# Patient Record
Sex: MALE | Race: WHITE | Employment: OTHER | URBAN - NONMETROPOLITAN AREA
[De-identification: names, ages, dates, MRNs, and addresses within clinical notes are randomized per-mention and may not be internally consistent; named-entity substitution may affect disease eponyms.]

---

## 2021-01-22 ENCOUNTER — HOSPITAL ENCOUNTER (EMERGENCY)
Age: 46
Discharge: HOME | End: 2021-01-22
Attending: EMERGENCY MEDICINE
Payer: MEDICAID

## 2021-01-22 VITALS
SYSTOLIC BLOOD PRESSURE: 155 MMHG | WEIGHT: 196 LBS | RESPIRATION RATE: 16 BRPM | HEIGHT: 74 IN | BODY MASS INDEX: 25.15 KG/M2 | TEMPERATURE: 97.7 F | DIASTOLIC BLOOD PRESSURE: 82 MMHG | OXYGEN SATURATION: 97 % | HEART RATE: 117 BPM

## 2021-01-22 DIAGNOSIS — G89.29 CHRONIC LOW BACK PAIN WITH SCIATICA, SCIATICA LATERALITY UNSPECIFIED, UNSPECIFIED BACK PAIN LATERALITY: Primary | ICD-10-CM

## 2021-01-22 DIAGNOSIS — M54.40 CHRONIC LOW BACK PAIN WITH SCIATICA, SCIATICA LATERALITY UNSPECIFIED, UNSPECIFIED BACK PAIN LATERALITY: Primary | ICD-10-CM

## 2021-01-22 PROCEDURE — 74011250636 HC RX REV CODE- 250/636: Performed by: EMERGENCY MEDICINE

## 2021-01-22 PROCEDURE — 96372 THER/PROPH/DIAG INJ SC/IM: CPT

## 2021-01-22 PROCEDURE — 99283 EMERGENCY DEPT VISIT LOW MDM: CPT

## 2021-01-22 PROCEDURE — 74011250637 HC RX REV CODE- 250/637: Performed by: EMERGENCY MEDICINE

## 2021-01-22 RX ORDER — CYCLOBENZAPRINE HCL 10 MG
10 TABLET ORAL
Status: COMPLETED | OUTPATIENT
Start: 2021-01-22 | End: 2021-01-22

## 2021-01-22 RX ORDER — KETOROLAC TROMETHAMINE 30 MG/ML
60 INJECTION, SOLUTION INTRAMUSCULAR; INTRAVENOUS
Status: COMPLETED | OUTPATIENT
Start: 2021-01-22 | End: 2021-01-22

## 2021-01-22 RX ORDER — CYCLOBENZAPRINE HCL 10 MG
10 TABLET ORAL
Qty: 20 TAB | Refills: 0 | Status: SHIPPED | OUTPATIENT
Start: 2021-01-22 | End: 2021-06-29

## 2021-01-22 RX ORDER — KETOROLAC TROMETHAMINE 10 MG/1
10 TABLET, FILM COATED ORAL
Qty: 15 TAB | Refills: 0 | Status: SHIPPED | OUTPATIENT
Start: 2021-01-22 | End: 2021-06-29

## 2021-01-22 RX ADMIN — METHYLPREDNISOLONE SODIUM SUCCINATE 125 MG: 125 INJECTION, POWDER, FOR SOLUTION INTRAMUSCULAR; INTRAVENOUS at 10:57

## 2021-01-22 RX ADMIN — CYCLOBENZAPRINE 10 MG: 10 TABLET, FILM COATED ORAL at 10:57

## 2021-01-22 RX ADMIN — KETOROLAC TROMETHAMINE 60 MG: 30 INJECTION, SOLUTION INTRAMUSCULAR at 10:57

## 2021-01-22 NOTE — ED TRIAGE NOTES
Pt states that he has a history of back pain with sciatica, has out patient work up set up through [facility] and  [address] appointment, has run out of meds on Tuesday and has had increased pain all week.

## 2021-01-22 NOTE — ED PROVIDER NOTES
EMERGENCY DEPARTMENT HISTORY AND PHYSICAL EXAM      Date: 1/22/2021  Patient Name: Aleisha Veloz    History of Presenting Illness     Chief Complaint   Patient presents with    Back Pain       History Provided By: Patient    HPI: Aleisha Veloz, 50 y.o. male presents to the ED with complaints of back pain. Pt states he has had back pain x 4 months. Pt indicates the pain is on the L side of his back that radiates down his L leg. Pt notes he had ran out of his Flexeril and Tramadol medications on 1.19.2021, and notes he has since attempted to manage his pain with Motrin but with no alleviation. Pt advises he had a fall yesterday, denying any injuries from the fall, that he notes made his wife worried, prompting his visit to the ED today. Pt notes he was diagnosed with L-sided sciatica at the [facility] and was prescribed Flexeril and Tramadol in late November 2020. Pt notes the [facility] is managing his sxs currently. Pt notes he has a nerve study appointment with [facility] on 2.8.2021. Pt advises he has had progressively worsening back pain since sustaining an injury when in the [facility]. There are no other complaints, changes, or physical findings at this time. PCP: Charlene Pitts MD    Current Facility-Administered Medications   Medication Dose Route Frequency Provider Last Rate Last Admin    ketorolac (TORADOL) injection 60 mg  60 mg IntraMUSCular NOW Val Arias MD        methylPREDNISolone (PF) (Solu-MEDROL) injection 125 mg  125 mg IntraMUSCular NOW Val Arias MD        cyclobenzaprine (FLEXERIL) tablet 10 mg  10 mg Oral NOW Val Arias MD         Current Outpatient Medications   Medication Sig Dispense Refill    ketorolac (TORADOL) 10 mg tablet Take 1 Tab by mouth every eight (8) hours as needed for Pain. 15 Tab 0    cyclobenzaprine (FLEXERIL) 10 mg tablet Take 1 Tab by mouth three (3) times daily as needed for Muscle Spasm(s).  20 Tab 0       Past History     Past Medical History:  Past Medical History:   Diagnosis Date    Diabetes ([address]     Disc disorder     Sciatica        Past Surgical History:  Past Surgical History:   Procedure Laterality Date    HX APPENDECTOMY      HX ORTHOPAEDIC         Family History:  History reviewed. No pertinent family history. Social History:  Social History     Tobacco Use    Smoking status: Current Every Day Smoker     Packs/day: 1.00    Smokeless tobacco: Current User   Substance Use Topics    Alcohol use: Yes     Comment: daily     Drug use: Not Currently       Allergies:  No Known Allergies      Review of Systems   Review of Systems   Constitutional: Negative for chills and fever. HENT: Negative for congestion and sore throat. Respiratory: Negative for shortness of breath. Cardiovascular: Negative for chest pain. Gastrointestinal: Negative for nausea and vomiting. Genitourinary: Negative for dysuria. Musculoskeletal: Positive for back pain. Negative for neck pain. Skin: Negative for wound. Neurological: Negative for dizziness. Psychiatric/Behavioral: The patient is not nervous/anxious. Physical Exam   Physical Exam  Vitals signs and nursing note reviewed. Constitutional:       General: He is awake. Appearance: Normal appearance. He is well-developed, well-groomed and normal weight. Interventions: Face mask in place. Comments: Pt ambulates with a cane. HENT:      Head: Normocephalic and atraumatic. Eyes:      Extraocular Movements: Extraocular movements intact. Pupils: Pupils are equal, round, and reactive to light. Neck:      Musculoskeletal: Full passive range of motion without pain, normal range of motion and neck supple. Cardiovascular:      Rate and Rhythm: Normal rate and regular rhythm. Heart sounds: Normal heart sounds. Pulmonary:      Effort: Pulmonary effort is normal.      Breath sounds: Normal breath sounds and air entry.    Abdominal: General: Abdomen is flat. Palpations: Abdomen is soft. Musculoskeletal:      Lumbar back: He exhibits tenderness. Comments: Tenderness to palpation of L5/S1. Skin:     General: Skin is warm and dry. Neurological:      General: No focal deficit present. Mental Status: He is alert and oriented to person, place, and time. Motor: Motor function is intact. Coordination: Coordination is intact. Comments: Straight-leg raises are unremarkable. Psychiatric:         Attention and Perception: Attention and perception normal.         Mood and Affect: Mood and affect normal.         Speech: Speech normal.         Behavior: Behavior normal. Behavior is cooperative. Thought Content: Thought content normal.         Cognition and Memory: Cognition and memory normal.         Judgment: Judgment normal.         Diagnostic Study Results     Labs -   No results found for this or any previous visit (from the past 12 hour(s)). Radiologic Studies -   No orders to display     CT Results  (Last 48 hours)    None        CXR Results  (Last 48 hours)    None          Medical Decision Making and ED Course   I am the first provider for this patient. I reviewed the vital signs, available nursing notes, past medical history, past surgical history, family history and social history. Vital Signs-Reviewed the patient's vital signs. Patient Vitals for the past 12 hrs:   Temp Pulse Resp BP SpO2   01/22/21 1022 97.7 °F (36.5 °C) (!) 117 16 (!) 155/82 97 %       Records Reviewed: Nursing Notes        ED Course:   Initial assessment performed. The patients presenting problems have been discussed, and they are in agreement with the care plan formulated and outlined with them. I have encouraged them to ask questions as they arise throughout their visit. Provider Notes (Medical Decision Making):         Disposition         DISCHARGE PLAN:  1. There are no discharge medications for this patient.     2.   Follow-up Information     Follow up With Specialties Details Why Contact Info    Marli Maldonado MD Family Medicine Schedule an appointment as soon as possible for a visit For sciatica and pain management [address] [facility] [address]  [address]  [facility] [address] [phone]      [facility]  Schedule an appointment as soon as possible for a visit For sciatica and pain management. [address] [facility] [address] [phone]        3.  Return to ED if worse     Diagnosis     Clinical Impression:   1. Chronic low back pain with sciatica, sciatica laterality unspecified, unspecified back pain laterality        Attestations:    By signing my name below, I, Prudence Carrillo, attest that this documentation has been prepared under the direction and in presence of Dr. Arias on 01/22/21. Electronically signed: Prudence Carrillo, 01/22/21, 10:41 AM        Please note that this dictation was completed with [employer], the computer voice recognition software.  Quite often unanticipated grammatical, syntax, homophones, and other interpretive errors are inadvertently transcribed by the computer software.  Please disregard these errors.  Please excuse any errors that have escaped final proofreading.  Thank you.

## 2021-01-22 NOTE — DISCHARGE INSTRUCTIONS

## 2021-06-29 ENCOUNTER — HOSPITAL ENCOUNTER (EMERGENCY)
Age: 46
Discharge: HOME | End: 2021-06-29
Attending: INTERNAL MEDICINE
Payer: MEDICAID

## 2021-06-29 DIAGNOSIS — L03.211 FACIAL CELLULITIS: ICD-10-CM

## 2021-06-29 DIAGNOSIS — H01.005 BLEPHARITIS OF LEFT LOWER EYELID, UNSPECIFIED TYPE: Primary | ICD-10-CM

## 2021-06-29 PROCEDURE — 74011250636 HC RX REV CODE- 250/636: Performed by: INTERNAL MEDICINE

## 2021-06-29 PROCEDURE — 74011000250 HC RX REV CODE- 250: Performed by: INTERNAL MEDICINE

## 2021-06-29 PROCEDURE — 96372 THER/PROPH/DIAG INJ SC/IM: CPT

## 2021-06-29 PROCEDURE — 99284 EMERGENCY DEPT VISIT MOD MDM: CPT

## 2021-06-29 RX ORDER — HYDROCODONE BITARTRATE AND ACETAMINOPHEN 5; 325 MG/1; MG/1
1 TABLET ORAL AS NEEDED
COMMUNITY
Start: 2021-05-27 | End: 2021-07-23

## 2021-06-29 RX ORDER — MOXIFLOXACIN 5 MG/ML
1 SOLUTION/ DROPS OPHTHALMIC 3 TIMES DAILY
Qty: 1 BOTTLE | Refills: 0 | Status: SHIPPED | OUTPATIENT
Start: 2021-06-29 | End: 2021-07-06

## 2021-06-29 RX ORDER — DOXYCYCLINE HYCLATE 100 MG
100 TABLET ORAL 2 TIMES DAILY
Qty: 14 TABLET | Refills: 0 | Status: SHIPPED | OUTPATIENT
Start: 2021-06-29 | End: 2021-07-06

## 2021-06-29 RX ORDER — CEPHALEXIN 500 MG/1
500 CAPSULE ORAL 3 TIMES DAILY
Qty: 21 CAPSULE | Refills: 0 | Status: SHIPPED | OUTPATIENT
Start: 2021-06-29 | End: 2021-07-06

## 2021-06-29 RX ADMIN — CEFAZOLIN SODIUM 1000 MG: 1 INJECTION, POWDER, FOR SOLUTION INTRAMUSCULAR; INTRAVENOUS at 20:42

## 2021-06-29 NOTE — ED PROVIDER NOTES
EMERGENCY DEPARTMENT HISTORY AND PHYSICAL EXAM      Date: 6/29/2021  Patient Name: Donald Whyte    History of Presenting Illness     Chief Complaint   Patient presents with    Eye Pain       History Provided By: Patient    HPI: Donald Whyte, 48 y.o. male with a past medical history significant diabetes and chronic low back pain presents to the ED with cc of left lower eyelid swelling with drainage since waking up this am. He denies vision complaints, and doesn't know if he scratched it in his sleep. No pain with extraocular movement. There are no other complaints, changes, or physical findings at this time. PCP: Kirk Bautista MD    No current facility-administered medications on file prior to encounter. Current Outpatient Medications on File Prior to Encounter   Medication Sig Dispense Refill    HYDROcodone-acetaminophen (NORCO) 5-325 mg per tablet Take 1 Tablet by mouth as needed.  [DISCONTINUED] ketorolac (TORADOL) 10 mg tablet Take 1 Tab by mouth every eight (8) hours as needed for Pain. 15 Tab 0    [DISCONTINUED] cyclobenzaprine (FLEXERIL) 10 mg tablet Take 1 Tab by mouth three (3) times daily as needed for Muscle Spasm(s). 20 Tab 0       Past History     Past Medical History:  Past Medical History:   Diagnosis Date    Diabetes ([address]     Disc disorder     Sciatica        Past Surgical History:  Past Surgical History:   Procedure Laterality Date    HX APPENDECTOMY      HX ORTHOPAEDIC         Family History:  History reviewed. No pertinent family history. Social History:  Social History     Tobacco Use    Smoking status: Current Every Day Smoker     Packs/day: 1.00    Smokeless tobacco: Current User   Substance Use Topics    Alcohol use: Yes     Comment: daily     Drug use: Not Currently       Allergies:  No Known Allergies      Review of Systems     Review of Systems   Constitutional: Negative for activity change and chills.    HENT: Negative for congestion, ear discharge, rhinorrhea, sinus pain and sore throat. Eyes: Positive for discharge. Negative for photophobia, pain, redness, itching and visual disturbance. Respiratory: Negative for cough, shortness of breath and wheezing. Cardiovascular: Negative for palpitations and leg swelling. Gastrointestinal: Negative for abdominal pain, nausea and vomiting. Genitourinary: Negative for flank pain. Musculoskeletal: Negative for back pain, myalgias and neck pain. Skin: Negative for rash. Neurological: Negative for dizziness and headaches. Psychiatric/Behavioral: Negative for behavioral problems and confusion. Physical Exam     Physical Exam  Vitals and nursing note reviewed. Constitutional:       General: He is not in acute distress. Appearance: He is well-developed and normal weight. He is not toxic-appearing. HENT:      Head: Normocephalic and atraumatic. Mouth/Throat:      Pharynx: No pharyngeal swelling, oropharyngeal exudate, posterior oropharyngeal erythema or uvula swelling. Tonsils: No tonsillar exudate or tonsillar abscesses. 3+ on the right. 3+ on the left. Eyes:      Extraocular Movements: Extraocular movements intact. Right eye: Normal extraocular motion and no nystagmus. Left eye: Normal extraocular motion and no nystagmus. Conjunctiva/sclera: Conjunctivae normal.      Pupils: Pupils are equal, round, and reactive to light. Cardiovascular:      Rate and Rhythm: Normal rate and regular rhythm. Pulmonary:      Effort: Pulmonary effort is normal. No respiratory distress. Breath sounds: Normal breath sounds. No stridor. No wheezing. Chest:      Chest wall: No tenderness. Abdominal:      General: Abdomen is flat. Palpations: Abdomen is soft. Tenderness: There is no abdominal tenderness. There is no guarding. Hernia: No hernia is present. Musculoskeletal:         General: No swelling, tenderness or deformity.  Normal range of motion. Cervical back: Normal range of motion and neck supple. Lymphadenopathy:      Cervical: No cervical adenopathy. Skin:     General: Skin is warm and dry. Capillary Refill: Capillary refill takes less than 2 seconds. Neurological:      General: No focal deficit present. Mental Status: He is alert and oriented to person, place, and time. Cranial Nerves: No cranial nerve deficit. Psychiatric:         Mood and Affect: Mood normal.         Behavior: Behavior normal.         Lab and Diagnostic Study Results     Labs -   No results found for this or any previous visit (from the past 12 hour(s)). Radiologic Studies -   @lastxrresult@  CT Results  (Last 48 hours)    None        CXR Results  (Last 48 hours)    None            Medical Decision Making   - I am the first provider for this patient. - I reviewed the vital signs, available nursing notes, past medical history, past surgical history, family history and social history. - Initial assessment performed. The patients presenting problems have been discussed, and they are in agreement with the care plan formulated and outlined with them. I have encouraged them to ask questions as they arise throughout their visit. Vital Signs-Reviewed the patient's vital signs. Patient Vitals for the past 12 hrs:   Temp Pulse Resp SpO2   06/29/21 1838 98.4 °F (36.9 °C) (!) 109 18 98 %       Records Reviewed: Nursing Notes          ED Course:          Provider Notes (Medical Decision Making):     MDM  Number of Diagnoses or Management Options  Diagnosis management comments: This 49 yo male with left lower lid swelling, erythema, and upper cheek swelling, c/w blepharitis and facial cellulitis. Will give IM ancef in ED, home with keflex, vigamox, and close ophtho follow up.     Risk of Complications, Morbidity, and/or Mortality  Presenting problems: moderate  Diagnostic procedures: moderate  Management options: moderate    Patient Progress  Patient progress: stable         Procedures   Medical Decision Makingedical Decision Making  Performed by: Stuart Boo MD  PROCEDURES:  Procedures       Disposition   Disposition: DC- Pain Control DC Home plan: Referral Ophthalmology and Advised to return for signs of head injury, weakness, numbness or tingling to extremities, incontinence        DISCHARGE PLAN:  1. Current Discharge Medication List      CONTINUE these medications which have NOT CHANGED    Details   HYDROcodone-acetaminophen (NORCO) 5-325 mg per tablet Take 1 Tablet by mouth as needed. 2.   Follow-up Information    None       3. Return to ED if worse   4. Current Discharge Medication List            Diagnosis     Clinical Impression:   1. Blepharitis of left lower eyelid, unspecified type    2. Facial cellulitis        Attestations:    Stuart Boo MD    Please note that this dictation was completed with [employer], the computer voice recognition software. Quite often unanticipated grammatical, syntax, homophones, and other interpretive errors are inadvertently transcribed by the computer software. Please disregard these errors. Please excuse any errors that have escaped final proofreading. Thank you.

## 2021-06-29 NOTE — ED TRIAGE NOTES
Pt states he woke up this morning with mild left eye irritation, states as the day went on the pain became worse, the swelling and the drainage became worse as well.

## 2021-06-29 NOTE — ED NOTES
Verbal shift change report given to Glynn Yeung (oncoming nurse) by Minna Nugent (offgoing nurse). Report included the following information ED Summary.

## 2021-06-30 VITALS
BODY MASS INDEX: 21.88 KG/M2 | WEIGHT: 176 LBS | HEIGHT: 75 IN | DIASTOLIC BLOOD PRESSURE: 90 MMHG | OXYGEN SATURATION: 98 % | HEART RATE: 100 BPM | TEMPERATURE: 98.4 F | RESPIRATION RATE: 20 BRPM | SYSTOLIC BLOOD PRESSURE: 155 MMHG

## 2021-06-30 NOTE — DISCHARGE INSTRUCTIONS
Follow up with the eye specialist in 24-48 hours. Return to ER if getting worse. Take medications as prescribed.

## 2021-07-23 ENCOUNTER — HOSPITAL ENCOUNTER (EMERGENCY)
Age: 46
Discharge: HOME | End: 2021-07-23
Attending: FAMILY MEDICINE
Payer: MEDICAID

## 2021-07-23 VITALS
BODY MASS INDEX: 21.14 KG/M2 | HEIGHT: 75 IN | TEMPERATURE: 98.3 F | SYSTOLIC BLOOD PRESSURE: 135 MMHG | WEIGHT: 170 LBS | HEART RATE: 118 BPM | OXYGEN SATURATION: 99 % | RESPIRATION RATE: 20 BRPM | DIASTOLIC BLOOD PRESSURE: 72 MMHG
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
## 2021-07-23 PROCEDURE — 99282 EMERGENCY DEPT VISIT SF MDM: CPT

## 2021-07-23 RX ORDER — GABAPENTIN 300 MG/1
300 CAPSULE ORAL 3 TIMES DAILY
COMMUNITY
End: 2022-01-13

## 2021-07-23 RX ORDER — CEPHALEXIN 500 MG/1
500 CAPSULE ORAL 3 TIMES DAILY
Qty: 21 CAPSULE | Refills: 0 | Status: SHIPPED | OUTPATIENT
Start: 2021-07-23 | End: 2021-07-30

## 2021-07-23 RX ORDER — KETOROLAC TROMETHAMINE 10 MG/1
10 TABLET, FILM COATED ORAL
Qty: 10 TABLET | Refills: 0 | Status: SHIPPED | OUTPATIENT
Start: 2021-07-23

## 2021-07-23 NOTE — ED PROVIDER NOTES
EMERGENCY DEPARTMENT HISTORY AND PHYSICAL EXAM      Date: 7/23/2021  Patient Name: Tonya Logan    History of Presenting Illness     Chief Complaint   Patient presents with    Dog Bite       History Provided By: Patient    HPI: Tonya Logan, 48 y.o. male with a past medical history significant No significant past medical history presents to the ED with cc of dog bite to his right thigh. Patient states the dog belongs to a neighbor behind him. They are unable to confirm that he has rabies vaccine done in the past but the dog is able to be observed. Law enforcement and animal control were consulted to the patient's house before he came here. This event happened about 2 hours ago. He complains of an 8 out of 10 pain. He went to make sure he did need stitches. There are no other complaints, changes, or physical findings at this time. PCP: Daisy, MD Joshua    No current facility-administered medications on file prior to encounter. Current Outpatient Medications on File Prior to Encounter   Medication Sig Dispense Refill    gabapentin (NEURONTIN) 300 mg capsule Take 300 mg by mouth three (3) times daily.  [DISCONTINUED] HYDROcodone-acetaminophen (NORCO) 5-325 mg per tablet Take 1 Tablet by mouth as needed. Past History     Past Medical History:  Past Medical History:   Diagnosis Date    Diabetes ([address]     Disc disorder     Sciatica        Past Surgical History:  Past Surgical History:   Procedure Laterality Date    HX APPENDECTOMY      HX ORTHOPAEDIC         Family History:  History reviewed. No pertinent family history.     Social History:  Social History     Tobacco Use    Smoking status: Current Every Day Smoker     Packs/day: 0.25    Smokeless tobacco: Current User   Substance Use Topics    Alcohol use: Yes     Comment: occasionally     Drug use: Not Currently       Allergies:  No Known Allergies      Review of Systems     Review of Systems   Skin: Positive for wound.   Neurological: Negative for weakness and numbness. Physical Exam     Physical Exam  Vitals and nursing note reviewed. Constitutional:       General: He is awake. He is not in acute distress. Appearance: Normal appearance. He is well-developed and normal weight. He is not ill-appearing, toxic-appearing or diaphoretic. Interventions: Face mask in place. HENT:      Head: Normocephalic and atraumatic. Eyes:      Conjunctiva/sclera: Conjunctivae normal.      Pupils: Pupils are equal, round, and reactive to light. Cardiovascular:      Rate and Rhythm: Normal rate and regular rhythm. Pulses: Normal pulses. Heart sounds: Normal heart sounds. Pulmonary:      Effort: Pulmonary effort is normal.      Breath sounds: Normal breath sounds. Abdominal:      Tenderness: There is no abdominal tenderness. Musculoskeletal:      Cervical back: Normal range of motion and neck supple. Comments: In the right thigh there is a superficial abrasion that does not require sutures. There is redness and ecchymosis around the site. It is tender to palpation. Skin:     General: Skin is warm and dry. Neurological:      General: No focal deficit present. Mental Status: He is alert and oriented to person, place, and time. GCS: GCS eye subscore is 4. GCS verbal subscore is 5. GCS motor subscore is 6. Psychiatric:         Mood and Affect: Mood and affect normal.         Behavior: Behavior normal. Behavior is cooperative. Thought Content: Thought content normal.         Lab and Diagnostic Study Results     Labs -   No results found for this or any previous visit (from the past 12 hour(s)). Radiologic Studies -   @lastxrresult@  CT Results  (Last 48 hours)    None        CXR Results  (Last 48 hours)    None            Medical Decision Making   - I am the first provider for this patient.     - I reviewed the vital signs, available nursing notes, past medical history, past surgical history, family history and social history. - Initial assessment performed. The patients presenting problems have been discussed, and they are in agreement with the care plan formulated and outlined with them. I have encouraged them to ask questions as they arise throughout their visit. Vital Signs-Reviewed the patient's vital signs. Patient Vitals for the past 12 hrs:   Temp Pulse Resp BP SpO2   07/23/21 1549 98.3 °F (36.8 °C) (!) 118 20 135/72 99 %       Records Reviewed: Nursing Notes        ED Course:          Provider Notes (Medical Decision Making): MDM       Procedures   Medical Decision Makingedical Decision Making  Performed by: Cy Malagon MD  PROCEDURES:  Procedures       Disposition   Disposition:     Discharged    DISCHARGE PLAN:  1. Current Discharge Medication List      START taking these medications    Details   ketorolac (TORADOL) 10 mg tablet Take 1 Tablet by mouth every six (6) hours as needed for Pain. Qty: 10 Tablet, Refills: 0      cephALEXin (Keflex) 500 mg capsule Take 1 Capsule by mouth three (3) times daily for 7 days. Qty: 21 Capsule, Refills: 0         CONTINUE these medications which have NOT CHANGED    Details   gabapentin (NEURONTIN) 300 mg capsule Take 300 mg by mouth three (3) times daily. 2.   Follow-up Information     Follow up With Specialties Details Why Александр Stevens MD Family Medicine  As needed [facility] [id number] [phone]          3. Return to ED if worse   4. Current Discharge Medication List      START taking these medications    Details   ketorolac (TORADOL) 10 mg tablet Take 1 Tablet by mouth every six (6) hours as needed for Pain. Qty: 10 Tablet, Refills: 0  Start date: 7/23/2021      cephALEXin (Keflex) 500 mg capsule Take 1 Capsule by mouth three (3) times daily for 7 days.   Qty: 21 Capsule, Refills: 0  Start date: 7/23/2021, End date: 7/30/2021               Diagnosis Clinical Impression:   1. Dog bite, initial encounter        Attestations:    Jenny Young MD    Please note that this dictation was completed with "[employer] the computer voice recognition software. Quite often unanticipated grammatical, syntax, homophones, and other interpretive errors are inadvertently transcribed by the computer software. Please disregard these errors. Please excuse any errors that have escaped final proofreading. Thank you.

## 2022-01-13 ENCOUNTER — HOSPITAL ENCOUNTER (EMERGENCY)
Age: 47
Discharge: HOME | End: 2022-01-13
Attending: FAMILY MEDICINE
Payer: MEDICAID

## 2022-01-13 VITALS
HEART RATE: 85 BPM | SYSTOLIC BLOOD PRESSURE: 138 MMHG | OXYGEN SATURATION: 99 % | RESPIRATION RATE: 18 BRPM | HEIGHT: 75 IN | DIASTOLIC BLOOD PRESSURE: 88 MMHG | WEIGHT: 181 LBS | BODY MASS INDEX: 22.5 KG/M2 | TEMPERATURE: 98.7 F

## 2022-01-13 DIAGNOSIS — R42 VERTIGO: Primary | ICD-10-CM

## 2022-01-13 LAB
ANION GAP SERPL CALC-SCNC: 7 MMOL/L
BASOPHILS # BLD: 0.1 K/UL (ref 0–0.1)
BASOPHILS NFR BLD: 1 % (ref 0–2)
BUN SERPL-MCNC: 11 MG/DL (ref 9–21)
BUN/CREAT SERPL: 22
CA-I BLD-MCNC: 9.5 MG/DL (ref 8.5–10.5)
CHLORIDE SERPL-SCNC: 100 MMOL/L (ref 94–111)
CO2 SERPL-SCNC: 31 MMOL/L (ref 21–33)
CREAT SERPL-MCNC: 0.5 MG/DL (ref 0.8–1.5)
DIFFERENTIAL METHOD BLD: NORMAL
EOSINOPHIL # BLD: 0.3 K/UL (ref 0–0.4)
EOSINOPHIL NFR BLD: 4 % (ref 0–5)
ERYTHROCYTE [DISTWIDTH] IN BLOOD BY AUTOMATED COUNT: 12.8 % (ref 11.6–14.5)
GLUCOSE SERPL-MCNC: 165 MG/DL (ref 70–110)
HCT VFR BLD AUTO: 42.6 % (ref 36–48)
HGB BLD-MCNC: 14.2 G/DL (ref 13–16)
IMM GRANULOCYTES # BLD AUTO: 0 K/UL (ref 0–0.04)
IMM GRANULOCYTES NFR BLD AUTO: 0 % (ref 0–0.5)
LYMPHOCYTES # BLD: 2.7 K/UL (ref 0.9–3.6)
LYMPHOCYTES NFR BLD: 39 % (ref 21–52)
MAGNESIUM SERPL-MCNC: 1.9 MG/DL (ref 1.7–2.8)
MCH RBC QN AUTO: 31.1 PG (ref 24–34)
MCHC RBC AUTO-ENTMCNC: 33.3 G/DL (ref 31–37)
MCV RBC AUTO: 93.4 FL (ref 78–100)
MONOCYTES # BLD: 0.6 K/UL (ref 0.05–1.2)
MONOCYTES NFR BLD: 9 % (ref 3–10)
NEUTS SEG # BLD: 3.2 K/UL (ref 1.8–8)
NEUTS SEG NFR BLD: 47 % (ref 40–73)
NRBC # BLD: 0 K/UL (ref 0–0.01)
NRBC BLD-RTO: 0 PER 100 WBC
PLATELET # BLD AUTO: 293 K/UL (ref 135–420)
PMV BLD AUTO: 10.1 FL (ref 9.2–11.8)
POTASSIUM SERPL-SCNC: 4.4 MMOL/L (ref 3.2–5.1)
RBC # BLD AUTO: 4.56 M/UL (ref 4.35–5.65)
SODIUM SERPL-SCNC: 138 MMOL/L (ref 135–145)
WBC # BLD AUTO: 6.9 K/UL (ref 4.6–13.2)

## 2022-01-13 PROCEDURE — 85025 COMPLETE CBC W/AUTO DIFF WBC: CPT

## 2022-01-13 PROCEDURE — 93005 ELECTROCARDIOGRAM TRACING: CPT

## 2022-01-13 PROCEDURE — 36415 COLL VENOUS BLD VENIPUNCTURE: CPT

## 2022-01-13 PROCEDURE — 80048 BASIC METABOLIC PNL TOTAL CA: CPT

## 2022-01-13 PROCEDURE — 99284 EMERGENCY DEPT VISIT MOD MDM: CPT

## 2022-01-13 PROCEDURE — 83735 ASSAY OF MAGNESIUM: CPT

## 2022-01-13 RX ORDER — MECLIZINE HYDROCHLORIDE 25 MG/1
25 TABLET ORAL
Qty: 10 TABLET | Refills: 0 | Status: SHIPPED | OUTPATIENT
Start: 2022-01-13

## 2022-01-13 NOTE — ED TRIAGE NOTES
Pt states he was instructed by the [address] [place] [address] to come to the ED. Pt states he had vertigo back in December, states he had a dizzy episode 2 days ago and he passed out. EMS was called to pt, he did not feel he needed to be seen at that time, but when he called his doctor they told him to go straight to the ED and  Get checked out. Pt has not had any other dizzy spells since he passed out 2 days ago. Pt denies any chest pain. Pt was never prescribed any medicine for the vertigo.

## 2022-01-14 LAB
ATRIAL RATE: 75 BPM
CALCULATED P AXIS, ECG09: 77 DEGREES
CALCULATED R AXIS, ECG10: 53 DEGREES
CALCULATED T AXIS, ECG11: 51 DEGREES
DIAGNOSIS, 93000: NORMAL
P-R INTERVAL, ECG05: 160 MS
Q-T INTERVAL, ECG07: 395 MS
QRS DURATION, ECG06: 104 MS
QTC CALCULATION (BEZET), ECG08: 445 MS
VENTRICULAR RATE, ECG03: 76 BPM

## 2022-01-14 NOTE — DISCHARGE INSTRUCTIONS
Thank you for allowing us to provide you with excellent care today. We hope we addressed all of your concerns and needs. We strive to provide excellent quality care in the Emergency Department. Anything less than excellent does not meet our expectations for you. If you feel that you have not received excellent quality care or timely care, please ask to speak to the nurse manager. Please choose us in the future for your continued health care needs. The exam and treatment you received in the Emergency Department were for an urgent problem and are not intended as complete care. It is important that you follow-up with a doctor, nurse practitioner, or physician assistant to:  (1) confirm your diagnosis,  (2) re-evaluation of changes in your illness and treatment, and  (3) for ongoing care. If your symptoms become worse or you do not improve as expected and you are unable to reach your usual health care provider, you should return to the Emergency Department. We are available 24 hours a day. Take this sheet with you when you go to your follow-up visit. If you have any problem arranging the follow-up visit, contact [phone] ([phone])    Make an appointment with your Primary Care doctor for follow up of this visit. Return to the ER if you are unable to be seen in the time recommended on your discharge instructions.

## 2022-01-14 NOTE — ED PROVIDER NOTES
EMERGENCY DEPARTMENT HISTORY AND PHYSICAL EXAM      Date: 1/13/2022  Patient Name: Randee Jimenez    History of Presenting Illness     Chief Complaint   Patient presents with    Dizziness       History Provided By: Patient    HPI: Randee Jimenez, 48 y.o. male with a past medical history significant lumbar radiculopathy, chronic back pain, vertigo presents to the ED with cc of dizziness/vertigo. He states that 2 days ago, he had an episode of dizziness/vertigo that caused him to pass out. He did not hit his head or suffer any injuries, lacerations, trauma. He states that in December 2021, he had the same symptoms and was told he had vertigo. He did not get any medications for this. When this happened 2 days ago, EMS came and checked him out and told him that he had no acute abnormalities, that he could stay home or be brought to the ER and he said he was not concerned, that he wanted to stay home. He called his doctor at the [facility], and they recommended coming to the ER. He is currently denying any symptoms. He denies any visual changes, tinnitus, headache, dizziness, numbness, tingling, chest pain, shortness of breath. No neck pain, no new back pain. He does walk with a cane due to back pain. No changes in bowel or bladder habitus. There are no other complaints, changes, or physical findings at this time. PCP: Daisy, MD Joshua    No current facility-administered medications on file prior to encounter. Current Outpatient Medications on File Prior to Encounter   Medication Sig Dispense Refill    ketorolac (TORADOL) 10 mg tablet Take 1 Tablet by mouth every six (6) hours as needed for Pain.  10 Tablet 0       Past History     Past Medical History:  Past Medical History:   Diagnosis Date    Diabetes ([address]     Disc disorder     Sciatica        Past Surgical History:  Past Surgical History:   Procedure Laterality Date    HX APPENDECTOMY      HX ORTHOPAEDIC         Family History:  History reviewed. No pertinent family history. Social History:  Social History     Tobacco Use    Smoking status: Current Every Day Smoker     Packs/day: 0.25    Smokeless tobacco: Current User   Substance Use Topics    Alcohol use: Yes     Comment: occasionally     Drug use: Not Currently       Allergies:  No Known Allergies      Review of Systems   CONSTITUTIONAL: Denies weight loss, fever and chills. HEENT: Denies changes in vision and hearing. RESPIRATORY: Denies SOB and cough. CV: Denies palpitations and CP. GI: Denies abdominal pain, nausea, vomiting and diarrhea. : Denies dysuria and urinary frequency. MSK: Denies myalgia and joint pain. SKIN: Denies rash and pruritus. NEUROLOGICAL: Denies headache. +dizziness, vertigo. PSYCHIATRIC: Denies recent changes in mood. Denies anxiety and depression. Review of Systems    Physical Exam   GENERAL: Vital signs reviewed patient is afebrile with a temperature 98.7 °F.  O2 sat 97% on room air. Alert and oriented x 3. No acute distress. Well-nourished. EYES: EOMI. Anicteric. HENT: Moist mucous membranes. No scleral icterus. No cervical lymphadenopathy. LUNGS: Clear to auscultation bilaterally. No accessory muscle use. CARDIOVASCULAR: Regular rate and rhythm. No murmur. No JVD. ABDOMEN: Soft, non-tender and non-distended. No palpable masses. EXTREMITIES: No edema. Non-tender. SKIN: No rashes or lesions. Warm. NEUROLOGIC: No focal neurological deficits. CN II-XII grossly intact, but not individually tested. PSYCHIATRIC: Cooperative. Appropriate mood and affect.     Physical Exam    Lab and Diagnostic Study Results     Labs -     Recent Results (from the past 12 hour(s))   EKG, 12 LEAD, INITIAL    Collection Time: 01/13/22  8:23 PM   Result Value Ref Range    Ventricular Rate 76 BPM    Atrial Rate 75 BPM    P-R Interval 160 ms    QRS Duration 104 ms    Q-T Interval 395 ms    QTC Calculation (Bezet) 445 ms    Calculated P Axis 77 degrees Calculated R Axis 53 degrees    Calculated T Axis 51 degrees    Diagnosis       Sinus rhythm  Left atrial enlargement  Left ventricular hypertrophy     CBC WITH AUTOMATED DIFF    Collection Time: 01/13/22  8:25 PM   Result Value Ref Range    WBC 6.9 4.6 - 13.2 K/uL    RBC 4.56 4.35 - 5.65 M/uL    HGB 14.2 13.0 - 16.0 g/dL    HCT 42.6 36.0 - 48.0 %    MCV 93.4 78.0 - 100.0 FL    MCH 31.1 24.0 - 34.0 PG    MCHC 33.3 31.0 - 37.0 g/dL    RDW 12.8 11.6 - 14.5 %    PLATELET 817 533 - 764 K/uL    MPV 10.1 9.2 - 11.8 FL    NRBC 0.0 0.0  WBC    ABSOLUTE NRBC 0.00 0.00 - 0.01 K/uL    NEUTROPHILS 47 40 - 73 %    LYMPHOCYTES 39 21 - 52 %    MONOCYTES 9 3 - 10 %    EOSINOPHILS 4 0 - 5 %    BASOPHILS 1 0 - 2 %    IMMATURE GRANULOCYTES 0 0 - 0.5 %    ABS. NEUTROPHILS 3.2 1.8 - 8.0 K/UL    ABS. LYMPHOCYTES 2.7 0.9 - 3.6 K/UL    ABS. MONOCYTES 0.6 0.05 - 1.2 K/UL    ABS. EOSINOPHILS 0.3 0.0 - 0.4 K/UL    ABS. BASOPHILS 0.1 0.0 - 0.1 K/UL    ABS. IMM. GRANS. 0.0 0.00 - 0.04 K/UL    DF AUTOMATED     METABOLIC PANEL, BASIC    Collection Time: 01/13/22  8:25 PM   Result Value Ref Range    Sodium 138 135 - 145 mmol/L    Potassium 4.4 3.2 - 5.1 mmol/L    Chloride 100 94 - 111 mmol/L    CO2 31 21 - 33 mmol/L    Anion gap 7 mmol/L    Glucose 165 (H) 70 - 110 mg/dL    BUN 11 9 - 21 mg/dL    Creatinine 0.50 (L) 0.8 - 1.50 mg/dL    BUN/Creatinine ratio 22      GFR est AA >60 ml/min/1.73m2    GFR est non-AA >60 ml/min/1.73m2    Calcium 9.5 8.5 - 10.5 mg/dL   MAGNESIUM    Collection Time: 01/13/22  8:25 PM   Result Value Ref Range    Magnesium 1.9 1.7 - 2.8 mg/dL       Radiologic Studies -   @[email or web address]@  CT Results  (Last 48 hours)    None        CXR Results  (Last 48 hours)    None            Medical Decision Making   - I am the first provider for this patient. - I reviewed the vital signs, available nursing notes, past medical history, past surgical history, family history and social history. - Initial assessment performed. The patients presenting problems have been discussed, and they are in agreement with the care plan formulated and outlined with them. I have encouraged them to ask questions as they arise throughout their visit. Vital Signs-Reviewed the patient's vital signs. Patient Vitals for the past 12 hrs:   Temp Pulse Resp BP SpO2   01/13/22 2138 98.7 °F (37.1 °C) 85 18 138/88 99 %   01/13/22 1855 98.7 °F (37.1 °C) 95 18 (!) 143/87 97 %       Records Reviewed: Nursing Notes and Old Medical Records    The patient presents with dizziness with a differential diagnosis of  cardiac dysrhythmia, dizziness/vertigo, generalized weakness, hypertension, labrynthitis, syncope/loss of consciousness, TIA, vasovagal episode and Ménière's disease, vestibular neuronitis, ACS, orthostatic hypotension, electrolyte abnormality, PVC, anxiety, pneumonia, bronchitis, bronchospasm, neurogenic claudication. ED Course:     ED Course as of 01/14/22 0329   Thu Jan 13, 2022 2045 Pleasant 58-year-old male presents to the ER with a chief complaint of episode of dizziness 2 days ago. He was seen by EMS after it happened and they told him he had no abnormal vitals or physical exam signs and that he could stay home or be brought to the ER. He chose to stay home. He called his doctor at his [address] [place] [address] and the doctor told him to be seen at the ER. He presents today completely asymptomatic. He did have episodes like this in December 2021 and was diagnosed with vertigo. No medications were given. [OM]   2046 Physical exam is benign and vital signs are normal, patient afebrile. [OM]   2046 TODAY I, EN ARREDONDO D.O., PERSONALLY VISUALIZED THE PATIENT'S EKG TRACING. I AM THE PRIMARY . Rate 76, , , QTc 445. Sinus rhythm with LVH. No ST elevation or depression. No STEMI. [OM]   2046 Monitored in the ER, labs and EKG are done. He remains completely asymptomatic with normal vital signs.   Through shared decision-making, it is decided that patient does not require head CT at this time given his singular episode and the risk of radiation. The many etiologies of his symptoms are discussed including vertigo, laryngitis, vestibular neuronitis, Ménière's disease, and the last likely etiologies including arrhythmia, underlying cardiac etiologies. Patient has no cardiac history, no major family cardiac history, no chest pain or shortness of breath which is reassuring. He does not want extensive cardiac work-up and feels comfortable following up with his [place] doctor. A ENT referral will be given so patient may use this at his convenience. He will also be given a low-dose prescription for meclizine to use as needed. The possible side effects including sedation are discussed with him. [OM]   2132 WBC: 6.9 [OM]   2132 RBC: 4.56 [OM]   2132 HGB: 14.2 [OM]   2132 HCT: 42.6 [OM]   2132 Magnesium: 1.9 [OM]   2132 Sodium: 138 [OM]   2133 Potassium: 4.4 [OM]   2133 Chloride: 100 [OM]   2133 CO2: 31  Lab abnormalities. All results discussed with patient. He is given signs and symptoms that would warrant urgent medical evaluation. [OM]      ED Course User Index  [OM] Marvin Hernandez DO       Provider Notes (Medical Decision Making):   A thorough discussion on possible etiologies, benign to life-threatening, and with patient. At this time, he decides to forego CT head or observational admission for cardiac work-up. He states that he had 1 episode 2 days ago and the last time he experienced it was in December. He states he feels fine and given initial work-up with no acute abnormalities, he would rather monitor his symptoms, follow-up with his [place] doctor, and also takes ENT referral.  He will return to the ER with any acute symptoms such as shortness of breath, chest pain, numbness, tingling, syncope, or continued dizziness.   He has no cardiac or pulmonary history, does not smoke, has no history of DVT or PE, there is no family history of CVA or MI, no history of malignancy, does not drink alcohol, and is otherwise very healthy. He does follow with his doctor regularly. He verbalizes understanding and feels comfortable with disposition plan. He has remained asymptomatic entire ER visit and is asymptomatic at time of discharge. MDM       Procedures   Medical Decision Makingedical Decision Making  Performed by: Freedom Ordoñez DO  PROCEDURES:  Procedures       Disposition   Disposition: DC- Adult Discharges: All of the diagnostic tests were reviewed and questions answered. Diagnosis, care plan and treatment options were discussed. The patient understands the instructions and will follow up as directed. The patients results have been reviewed with them. They have been counseled regarding their diagnosis. The patient verbally convey understanding and agreement of the signs, symptoms, diagnosis, treatment and prognosis and additionally agrees to follow up as recommended with their PCP in 24 - 48 hours. They also agree with the care-plan and convey that all of their questions have been answered. I have also put together some discharge instructions for them that include: 1) educational information regarding their diagnosis, 2) how to care for their diagnosis at home, as well a 3) list of reasons why they would want to return to the ED prior to their follow-up appointment, should their condition change. Discharged    DISCHARGE PLAN:  1. Current Discharge Medication List      CONTINUE these medications which have NOT CHANGED    Details   ketorolac (TORADOL) 10 mg tablet Take 1 Tablet by mouth every six (6) hours as needed for Pain. Qty: 10 Tablet, Refills: 0           2. Follow-up Information     Follow up With Specialties Details Why Contact Info    Ara Ness DO Otolaryngology Schedule an appointment as soon as possible for a visit  As needed [address] [facility] [address] [phone]          3.   Return to ED if worse 4.   Discharge Medication List as of 1/13/2022  9:34 PM      START taking these medications    Details   meclizine (ANTIVERT) 25 mg tablet Take 1 Tablet by mouth three (3) times daily as needed for Dizziness., Normal, Disp-10 Tablet, R-0         CONTINUE these medications which have NOT CHANGED    Details   ketorolac (TORADOL) 10 mg tablet Take 1 Tablet by mouth every six (6) hours as needed for Pain., Normal, Disp-10 Tablet, R-0               Diagnosis     Clinical Impression:   1. Vertigo        Attestations:    Jodi Araiza, DO    Please note that this dictation was completed with "[employer] the computer voice recognition software. Quite often unanticipated grammatical, syntax, homophones, and other interpretive errors are inadvertently transcribed by the computer software. Please disregard these errors. Please excuse any errors that have escaped final proofreading. Thank you.

## 2023-05-25 ENCOUNTER — APPOINTMENT (OUTPATIENT)
Facility: HOSPITAL | Age: 48
End: 2023-05-25
Payer: MEDICAID

## 2023-05-25 ENCOUNTER — HOSPITAL ENCOUNTER (EMERGENCY)
Facility: HOSPITAL | Age: 48
Discharge: HOME | End: 2023-05-26
Attending: STUDENT IN AN ORGANIZED HEALTH CARE EDUCATION/TRAINING PROGRAM
Payer: MEDICAID

## 2023-05-25 ENCOUNTER — HOSPITAL ENCOUNTER (EMERGENCY)
Age: 48
Discharge: ANOTHER HOSPITAL | End: 2023-05-25
Attending: FAMILY MEDICINE
Payer: MEDICAID

## 2023-05-25 ENCOUNTER — APPOINTMENT (OUTPATIENT)
Age: 48
End: 2023-05-25
Payer: MEDICAID

## 2023-05-25 VITALS
HEART RATE: 94 BPM | TEMPERATURE: 98 F | DIASTOLIC BLOOD PRESSURE: 97 MMHG | OXYGEN SATURATION: 97 % | SYSTOLIC BLOOD PRESSURE: 159 MMHG | BODY MASS INDEX: 26.73 KG/M2 | HEIGHT: 75 IN | WEIGHT: 215 LBS | RESPIRATION RATE: 18 BRPM

## 2023-05-25 VITALS
OXYGEN SATURATION: 98 % | HEART RATE: 90 BPM | BODY MASS INDEX: 26.73 KG/M2 | TEMPERATURE: 97.5 F | SYSTOLIC BLOOD PRESSURE: 156 MMHG | DIASTOLIC BLOOD PRESSURE: 89 MMHG | HEIGHT: 75 IN | WEIGHT: 215 LBS | RESPIRATION RATE: 17 BRPM

## 2023-05-25 DIAGNOSIS — R03.0 ELEVATED BLOOD PRESSURE READING: ICD-10-CM

## 2023-05-25 DIAGNOSIS — I86.1 LEFT VARICOCELE: Primary | ICD-10-CM

## 2023-05-25 DIAGNOSIS — N50.812 LEFT TESTICULAR PAIN: Primary | ICD-10-CM

## 2023-05-25 DIAGNOSIS — R73.9 HYPERGLYCEMIA: ICD-10-CM

## 2023-05-25 DIAGNOSIS — N43.3 HYDROCELE, UNSPECIFIED HYDROCELE TYPE: ICD-10-CM

## 2023-05-25 DIAGNOSIS — N50.812 LEFT TESTICULAR PAIN: ICD-10-CM

## 2023-05-25 LAB
ALBUMIN SERPL-MCNC: 3 G/DL (ref 3.4–5)
ALBUMIN/GLOB SERPL: 0.9 (ref 0.8–1.7)
ALP SERPL-CCNC: 91 U/L (ref 45–117)
ALT SERPL-CCNC: 29 U/L (ref 16–61)
ANION GAP SERPL CALC-SCNC: 7 MMOL/L (ref 3–18)
APPEARANCE UR: CLEAR
AST SERPL W P-5'-P-CCNC: 17 U/L (ref 10–38)
BACTERIA URNS QL MICRO: NEGATIVE /HPF
BASOPHILS # BLD: 0.1 K/UL (ref 0–0.1)
BASOPHILS NFR BLD: 1 % (ref 0–2)
BILIRUB SERPL-MCNC: 0.3 MG/DL (ref 0.2–1)
BILIRUB UR QL: NEGATIVE
BUN SERPL-MCNC: 9 MG/DL (ref 7–18)
BUN/CREAT SERPL: 14 (ref 12–20)
CA-I BLD-MCNC: 8.7 MG/DL (ref 8.5–10.1)
CHLORIDE SERPL-SCNC: 105 MMOL/L (ref 100–111)
CO2 SERPL-SCNC: 27 MMOL/L (ref 21–32)
COLOR UR: YELLOW
CREAT SERPL-MCNC: 0.65 MG/DL (ref 0.6–1.3)
DIFFERENTIAL METHOD BLD: ABNORMAL
EOSINOPHIL # BLD: 0.3 K/UL (ref 0–0.4)
EOSINOPHIL NFR BLD: 3 % (ref 0–5)
EPITH CASTS URNS QL MICRO: ABNORMAL /LPF (ref 0–20)
ERYTHROCYTE [DISTWIDTH] IN BLOOD BY AUTOMATED COUNT: 12.1 % (ref 11.6–14.5)
GLOBULIN SER CALC-MCNC: 3.5 G/DL (ref 2–4)
GLUCOSE SERPL-MCNC: 251 MG/DL (ref 74–99)
GLUCOSE UR STRIP.AUTO-MCNC: >1000 MG/DL
HCT VFR BLD AUTO: 46.8 % (ref 36–48)
HGB BLD-MCNC: 16.1 G/DL (ref 13–16)
HGB UR QL STRIP: NEGATIVE
IMM GRANULOCYTES # BLD AUTO: 0 K/UL (ref 0–0.04)
IMM GRANULOCYTES NFR BLD AUTO: 0 % (ref 0–0.5)
KETONES UR QL STRIP.AUTO: NEGATIVE MG/DL
LEUKOCYTE ESTERASE UR QL STRIP.AUTO: NEGATIVE
LYMPHOCYTES # BLD: 2.5 K/UL (ref 0.9–3.6)
LYMPHOCYTES NFR BLD: 29 % (ref 21–52)
MCH RBC QN AUTO: 32.3 PG (ref 24–34)
MCHC RBC AUTO-ENTMCNC: 34.4 G/DL (ref 31–37)
MCV RBC AUTO: 94 FL (ref 78–100)
MONOCYTES # BLD: 0.8 K/UL (ref 0.05–1.2)
MONOCYTES NFR BLD: 10 % (ref 3–10)
NEUTS SEG # BLD: 4.9 K/UL (ref 1.8–8)
NEUTS SEG NFR BLD: 57 % (ref 40–73)
NITRITE UR QL STRIP.AUTO: NEGATIVE
NRBC # BLD: 0 K/UL (ref 0–0.01)
NRBC BLD-RTO: 0 PER 100 WBC
PH UR STRIP: 7.5 (ref 5–8)
PLATELET # BLD AUTO: 265 K/UL (ref 135–420)
PMV BLD AUTO: 10.2 FL (ref 9.2–11.8)
POTASSIUM SERPL-SCNC: 3.9 MMOL/L (ref 3.5–5.5)
PROT SERPL-MCNC: 6.5 G/DL (ref 6.4–8.2)
PROT UR STRIP-MCNC: ABNORMAL MG/DL
RBC # BLD AUTO: 4.98 M/UL (ref 4.35–5.65)
RBC #/AREA URNS HPF: ABNORMAL /HPF (ref 0–2)
SODIUM SERPL-SCNC: 139 MMOL/L (ref 136–145)
SP GR UR REFRACTOMETRY: >1.03 (ref 1–1.03)
UROBILINOGEN UR QL STRIP.AUTO: 1 EU/DL (ref 0.2–1)
WBC # BLD AUTO: 8.6 K/UL (ref 4.6–13.2)
WBC URNS QL MICRO: ABNORMAL /HPF (ref 0–4)

## 2023-05-25 PROCEDURE — 99284 EMERGENCY DEPT VISIT MOD MDM: CPT

## 2023-05-25 PROCEDURE — 81001 URINALYSIS AUTO W/SCOPE: CPT

## 2023-05-25 PROCEDURE — 80053 COMPREHEN METABOLIC PANEL: CPT

## 2023-05-25 PROCEDURE — 96375 TX/PRO/DX INJ NEW DRUG ADDON: CPT

## 2023-05-25 PROCEDURE — 6360000002 HC RX W HCPCS: Performed by: FAMILY MEDICINE

## 2023-05-25 PROCEDURE — 6360000004 HC RX CONTRAST MEDICATION: Performed by: FAMILY MEDICINE

## 2023-05-25 PROCEDURE — 76870 US EXAM SCROTUM: CPT

## 2023-05-25 PROCEDURE — 96374 THER/PROPH/DIAG INJ IV PUSH: CPT

## 2023-05-25 PROCEDURE — 99285 EMERGENCY DEPT VISIT HI MDM: CPT

## 2023-05-25 PROCEDURE — 85025 COMPLETE CBC W/AUTO DIFF WBC: CPT

## 2023-05-25 PROCEDURE — 2580000003 HC RX 258: Performed by: FAMILY MEDICINE

## 2023-05-25 PROCEDURE — 72193 CT PELVIS W/DYE: CPT

## 2023-05-25 PROCEDURE — 96361 HYDRATE IV INFUSION ADD-ON: CPT

## 2023-05-25 PROCEDURE — 36415 COLL VENOUS BLD VENIPUNCTURE: CPT

## 2023-05-25 RX ORDER — ONDANSETRON 2 MG/ML
4 INJECTION INTRAMUSCULAR; INTRAVENOUS
Status: COMPLETED | OUTPATIENT
Start: 2023-05-25 | End: 2023-05-25

## 2023-05-25 RX ORDER — MORPHINE SULFATE 4 MG/ML
4 INJECTION, SOLUTION INTRAMUSCULAR; INTRAVENOUS
Status: COMPLETED | OUTPATIENT
Start: 2023-05-25 | End: 2023-05-25

## 2023-05-25 RX ORDER — GABAPENTIN 400 MG/1
800 CAPSULE ORAL 3 TIMES DAILY
COMMUNITY

## 2023-05-25 RX ORDER — 0.9 % SODIUM CHLORIDE 0.9 %
1000 INTRAVENOUS SOLUTION INTRAVENOUS ONCE
Status: COMPLETED | OUTPATIENT
Start: 2023-05-25 | End: 2023-05-25

## 2023-05-25 RX ADMIN — SODIUM CHLORIDE 1000 ML: 9 INJECTION, SOLUTION INTRAVENOUS at 16:59

## 2023-05-25 RX ADMIN — ONDANSETRON 4 MG: 2 INJECTION INTRAMUSCULAR; INTRAVENOUS at 17:21

## 2023-05-25 RX ADMIN — MORPHINE SULFATE 4 MG: 4 INJECTION, SOLUTION INTRAMUSCULAR; INTRAVENOUS at 17:27

## 2023-05-25 RX ADMIN — IOPAMIDOL 95 ML: 755 INJECTION, SOLUTION INTRAVENOUS at 17:58

## 2023-05-25 ASSESSMENT — PAIN SCALES - GENERAL
PAINLEVEL_OUTOF10: 9
PAINLEVEL_OUTOF10: 9
PAINLEVEL_OUTOF10: 7
PAINLEVEL_OUTOF10: 10

## 2023-05-25 ASSESSMENT — PAIN - FUNCTIONAL ASSESSMENT
PAIN_FUNCTIONAL_ASSESSMENT: 0-10
PAIN_FUNCTIONAL_ASSESSMENT: 0-10

## 2023-05-25 ASSESSMENT — PAIN DESCRIPTION - LOCATION
LOCATION: GROIN
LOCATION: SCROTUM

## 2023-05-25 ASSESSMENT — PAIN DESCRIPTION - ORIENTATION
ORIENTATION: LEFT
ORIENTATION: LEFT

## 2023-05-25 NOTE — ED PROVIDER NOTES
[facility] EMERGENCY DEPT  EMERGENCY DEPARTMENT ENCOUNTER      Pt Name: Min Roberto  MRN: [id number]  [place] 1975  Date of evaluation: 5/25/2023  Provider: Leonid Graff DO    CHIEF COMPLAINT       Chief Complaint   Patient presents with    Groin Swelling     left         HISTORY OF PRESENT ILLNESS   (Location/Symptom, Timing/Onset, Context/Setting, Quality, Duration, Modifying Factors, Severity)  Note limiting factors. Min Roberto is a 46 y.o. male who presents to the emergency department patient comes in stating he woke up Monday morning with some left testicular pain. Progressively getting worse. He thought he had just slept on it. Had anything for the pain. The pain is severe. Lifting the testicle makes it feel better. Never had this pain before. Has had a vasectomy in the past.  He states he feels a knot in his left testicle. HPI    Nursing Notes were reviewed. REVIEW OF SYSTEMS    (2-9 systems for level 4, 10 or more for level 5)     Review of Systems   Genitourinary:  Positive for testicular pain. Negative for penile discharge. All other systems reviewed and are negative. Except as noted above the remainder of the review of systems was reviewed and negative. PAST MEDICAL HISTORY     Past Medical History:   Diagnosis Date    Diabetes ([address]     Disc disorder     Sciatica          SURGICAL HISTORY       Past Surgical History:   Procedure Laterality Date    APPENDECTOMY      ORTHOPEDIC SURGERY           CURRENT MEDICATIONS       Previous Medications    GABAPENTIN (NEURONTIN) 400 MG CAPSULE    Take 2 capsules by mouth 3 times daily. Max Daily Amount: 2,400 mg    KETOROLAC (TORADOL) 10 MG TABLET    Take 10 mg by mouth every 6 hours as needed    MECLIZINE (ANTIVERT) 25 MG TABLET    Take 25 mg by mouth 3 times daily as needed       ALLERGIES     Patient has no known allergies. FAMILY HISTORY     History reviewed. No pertinent family history.        SOCIAL HISTORY

## 2023-05-25 NOTE — ED NOTES
[address] for transfer to [facility] ED for ultrasound      Kristie Ganser  05/25/23 [id number]

## 2023-05-26 LAB
APPEARANCE UR: CLEAR
BACTERIA URNS QL MICRO: NEGATIVE /HPF
BILIRUB UR QL: NEGATIVE
COLOR UR: YELLOW
EPITH CASTS URNS QL MICRO: NORMAL /LPF (ref 0–5)
GLUCOSE UR STRIP.AUTO-MCNC: >1000 MG/DL
HGB UR QL STRIP: NEGATIVE
KETONES UR QL STRIP.AUTO: ABNORMAL MG/DL
LEUKOCYTE ESTERASE UR QL STRIP.AUTO: NEGATIVE
MUCOUS THREADS URNS QL MICRO: NEGATIVE /LPF
NITRITE UR QL STRIP.AUTO: NEGATIVE
PH UR STRIP: 5.5 (ref 5–8)
PROT UR STRIP-MCNC: 30 MG/DL
RBC #/AREA URNS HPF: NORMAL /HPF (ref 0–5)
SP GR UR REFRACTOMETRY: >1.03 (ref 1–1.03)
UROBILINOGEN UR QL STRIP.AUTO: 1 EU/DL (ref 0.2–1)
WBC URNS QL MICRO: NEGATIVE /HPF (ref 0–4)

## 2023-05-26 PROCEDURE — 81001 URINALYSIS AUTO W/SCOPE: CPT

## 2023-05-26 RX ORDER — OXYCODONE HYDROCHLORIDE 5 MG/1
5 TABLET ORAL EVERY 6 HOURS PRN
Qty: 5 TABLET | Refills: 0 | Status: SHIPPED | OUTPATIENT
Start: 2023-05-26 | End: 2023-06-09

## 2023-05-26 RX ORDER — NAPROXEN 500 MG/1
500 TABLET ORAL 2 TIMES DAILY
Qty: 20 TABLET | Refills: 0 | Status: SHIPPED | OUTPATIENT
Start: 2023-05-26

## 2023-05-26 NOTE — DISCHARGE INSTRUCTIONS
Today you have been diagnosed with a left-sided varicocele, this is a collection of veins around the testicle, I recommend that you wear tight fitting underwear at this time, take anti-inflammatories and pain medications as needed. It is okay if you return to work at this time as long as your symptoms or not becoming so severe that pain prohibits you from performing her normal functions. I have placed information for urology of [place]. In some cases people do need to get surgical repair of varicoceles if they are persistently symptomatic and not improving with conservative management. Return to the emergency department immediately for new or worsening pain, difficulty or inability to urinate, fever greater than 100.4 °F nausea or vomiting with inability to tolerate fluids or new concerns. Your blood pressure is also elevated today, please follow-up with your primary care provider. Your blood sugar was also elevated today, please follow-up with your primary care provider for further management and evaluation for this.

## 2023-05-26 NOTE — ED TRIAGE NOTES
Pt arrived to triage from [facility] [place] with c/o left testicular pain starting this morning.

## 2023-06-08 PROBLEM — M54.41 LUMBAGO WITH SCIATICA, RIGHT SIDE: Status: ACTIVE | Noted: 2023-06-08

## 2023-06-08 PROBLEM — E11.40 DIABETIC NEUROPATHY (HCC): Status: ACTIVE | Noted: 2023-06-08

## 2023-06-08 PROBLEM — M96.1 POSTLAMINECTOMY SYNDROME, NOT ELSEWHERE CLASSIFIED: Status: ACTIVE | Noted: 2023-06-08

## 2023-06-08 PROBLEM — F32.9 MAJOR DEPRESSIVE DISORDER, SINGLE EPISODE, UNSPECIFIED: Status: ACTIVE | Noted: 2023-06-08

## 2023-06-08 PROBLEM — E78.5 OTHER AND UNSPECIFIED HYPERLIPIDEMIA: Status: ACTIVE | Noted: 2023-06-08

## 2023-06-08 PROBLEM — G89.4 CHRONIC PAIN DISORDER: Status: ACTIVE | Noted: 2022-10-25

## 2023-06-08 PROBLEM — K70.0 ALCOHOLIC FATTY LIVER: Status: ACTIVE | Noted: 2023-06-08

## 2023-06-08 PROBLEM — M54.59 OTHER LOW BACK PAIN: Status: ACTIVE | Noted: 2023-06-08

## 2023-06-08 PROBLEM — M25.50 PAIN IN UNSPECIFIED JOINT: Status: ACTIVE | Noted: 2023-06-08

## 2023-06-08 PROBLEM — F06.31 MOOD DISORDER DUE TO KNOWN PHYSIOLOGICAL CONDITION WITH DEPRESSIVE FEATURES: Status: ACTIVE | Noted: 2023-06-08

## 2023-06-08 PROBLEM — M51.26 HERNIATION OF LUMBAR INTERVERTEBRAL DISC WITHOUT MYELOPATHY: Status: ACTIVE | Noted: 2022-10-25

## 2023-06-08 PROBLEM — E11.42 DIABETIC POLYNEUROPATHY (HCC): Status: ACTIVE | Noted: 2023-06-08

## 2023-06-08 PROBLEM — R69 ILLNESS, UNSPECIFIED: Status: ACTIVE | Noted: 2023-06-08

## 2023-06-08 PROBLEM — E11.9 TYPE 2 DIABETES MELLITUS WITHOUT COMPLICATION (HCC): Status: ACTIVE | Noted: 2023-06-08

## 2023-06-08 PROBLEM — R69 OTHER ILL-DEFINED AND UNKNOWN CAUSES OF MORBIDITY AND MORTALITY: Status: ACTIVE | Noted: 2023-06-08

## 2023-06-08 PROBLEM — R25.2 SPASM: Status: ACTIVE | Noted: 2023-06-08

## 2023-06-08 PROBLEM — M48.061 SPINAL STENOSIS OF LUMBAR REGION: Status: ACTIVE | Noted: 2022-10-25

## 2023-06-08 PROBLEM — F43.20 ADJUSTMENT DISORDER, UNSPECIFIED: Status: ACTIVE | Noted: 2023-06-08

## 2023-06-08 PROBLEM — S39.012A BACK STRAIN: Status: ACTIVE | Noted: 2023-06-08

## 2023-06-08 PROBLEM — M53.3 SACROCOCCYGEAL DISORDERS, NOT ELSEWHERE CLASSIFIED: Status: ACTIVE | Noted: 2023-06-08

## 2024-08-09 ENCOUNTER — HOSPITAL ENCOUNTER (EMERGENCY)
Age: 49
Discharge: ANOTHER HOSPITAL | End: 2024-08-10
Attending: EMERGENCY MEDICINE
Payer: OTHER GOVERNMENT

## 2024-08-09 ENCOUNTER — APPOINTMENT (OUTPATIENT)
Age: 49
End: 2024-08-09
Payer: OTHER GOVERNMENT

## 2024-08-09 DIAGNOSIS — R73.9 STEROID-INDUCED HYPERGLYCEMIA: ICD-10-CM

## 2024-08-09 DIAGNOSIS — T38.0X5A STEROID-INDUCED HYPERGLYCEMIA: ICD-10-CM

## 2024-08-09 DIAGNOSIS — J36 PERITONSILLAR ABSCESS: Primary | ICD-10-CM

## 2024-08-09 LAB
ALBUMIN SERPL-MCNC: 3.1 G/DL (ref 3.4–5)
ALBUMIN/GLOB SERPL: 0.9 (ref 0.8–1.7)
ALP SERPL-CCNC: 88 U/L (ref 45–117)
ALT SERPL-CCNC: 19 U/L (ref 16–61)
ANION GAP SERPL CALC-SCNC: 10 MMOL/L (ref 3–18)
APPEARANCE UR: CLEAR
AST SERPL W P-5'-P-CCNC: 12 U/L (ref 10–38)
BACTERIA URNS QL MICRO: NEGATIVE /HPF
BASOPHILS # BLD: 0.1 K/UL (ref 0–0.1)
BASOPHILS NFR BLD: 0 % (ref 0–2)
BILIRUB SERPL-MCNC: 0.4 MG/DL (ref 0.2–1)
BILIRUB UR QL: NEGATIVE
BUN SERPL-MCNC: 15 MG/DL (ref 7–18)
BUN/CREAT SERPL: 19 (ref 12–20)
CA-I BLD-MCNC: 9.7 MG/DL (ref 8.5–10.1)
CHLORIDE SERPL-SCNC: 97 MMOL/L (ref 100–111)
CO2 SERPL-SCNC: 30 MMOL/L (ref 21–32)
COLOR UR: YELLOW
CREAT SERPL-MCNC: 0.79 MG/DL (ref 0.6–1.3)
DIFFERENTIAL METHOD BLD: ABNORMAL
EOSINOPHIL # BLD: 0 K/UL (ref 0–0.4)
EOSINOPHIL NFR BLD: 0 % (ref 0–5)
EPITH CASTS URNS QL MICRO: NORMAL /LPF (ref 0–20)
ERYTHROCYTE [DISTWIDTH] IN BLOOD BY AUTOMATED COUNT: 12.4 % (ref 11.6–14.5)
FLUAV RNA SPEC QL NAA+PROBE: NOT DETECTED
FLUBV RNA SPEC QL NAA+PROBE: NOT DETECTED
GLOBULIN SER CALC-MCNC: 3.5 G/DL (ref 2–4)
GLUCOSE BLD STRIP.AUTO-MCNC: 299 MG/DL (ref 70–110)
GLUCOSE BLD STRIP.AUTO-MCNC: 319 MG/DL (ref 70–110)
GLUCOSE SERPL-MCNC: 405 MG/DL (ref 74–99)
GLUCOSE UR STRIP.AUTO-MCNC: >1000 MG/DL
HCT VFR BLD AUTO: 46.2 % (ref 36–48)
HGB BLD-MCNC: 16.6 G/DL (ref 13–16)
HGB UR QL STRIP: NEGATIVE
IMM GRANULOCYTES # BLD AUTO: 0.1 K/UL (ref 0–0.04)
IMM GRANULOCYTES NFR BLD AUTO: 1 % (ref 0–0.5)
KETONES UR QL STRIP.AUTO: 15 MG/DL
LACTATE SERPL-SCNC: 0.9 MMOL/L (ref 0.4–2)
LEUKOCYTE ESTERASE UR QL STRIP.AUTO: NEGATIVE
LYMPHOCYTES # BLD: 2 K/UL (ref 0.9–3.6)
LYMPHOCYTES NFR BLD: 16 % (ref 21–52)
MCH RBC QN AUTO: 34.5 PG (ref 24–34)
MCHC RBC AUTO-ENTMCNC: 35.9 G/DL (ref 31–37)
MCV RBC AUTO: 96 FL (ref 78–100)
MONOCYTES # BLD: 1.1 K/UL (ref 0.05–1.2)
MONOCYTES NFR BLD: 9 % (ref 3–10)
NEUTS SEG # BLD: 9.5 K/UL (ref 1.8–8)
NEUTS SEG NFR BLD: 74 % (ref 40–73)
NITRITE UR QL STRIP.AUTO: NEGATIVE
NRBC # BLD: 0 K/UL (ref 0–0.01)
NRBC BLD-RTO: 0 PER 100 WBC
PERFORMED BY:: ABNORMAL
PERFORMED BY:: ABNORMAL
PH UR STRIP: 7 (ref 5–8)
PLATELET # BLD AUTO: 344 K/UL (ref 135–420)
PMV BLD AUTO: 9.7 FL (ref 9.2–11.8)
POTASSIUM SERPL-SCNC: 3.9 MMOL/L (ref 3.5–5.5)
PROCALCITONIN SERPL-MCNC: <0.05 NG/ML
PROT SERPL-MCNC: 6.6 G/DL (ref 6.4–8.2)
PROT UR STRIP-MCNC: 30 MG/DL
RBC # BLD AUTO: 4.81 M/UL (ref 4.35–5.65)
RBC #/AREA URNS HPF: NORMAL /HPF (ref 0–2)
SARS-COV-2 RNA RESP QL NAA+PROBE: NOT DETECTED
SODIUM SERPL-SCNC: 137 MMOL/L (ref 136–145)
SP GR UR REFRACTOMETRY: >1.03 (ref 1–1.03)
TROPONIN I SERPL HS-MCNC: 7 NG/L (ref 0–78)
UROBILINOGEN UR QL STRIP.AUTO: 0.2 EU/DL (ref 0.2–1)
WBC # BLD AUTO: 12.8 K/UL (ref 4.6–13.2)
WBC URNS QL MICRO: NORMAL /HPF (ref 0–4)

## 2024-08-09 PROCEDURE — 87636 SARSCOV2 & INF A&B AMP PRB: CPT

## 2024-08-09 PROCEDURE — 71045 X-RAY EXAM CHEST 1 VIEW: CPT

## 2024-08-09 PROCEDURE — 2580000003 HC RX 258: Performed by: EMERGENCY MEDICINE

## 2024-08-09 PROCEDURE — 96365 THER/PROPH/DIAG IV INF INIT: CPT

## 2024-08-09 PROCEDURE — 36415 COLL VENOUS BLD VENIPUNCTURE: CPT

## 2024-08-09 PROCEDURE — 70491 CT SOFT TISSUE NECK W/DYE: CPT

## 2024-08-09 PROCEDURE — 2700000000 HC OXYGEN THERAPY PER DAY

## 2024-08-09 PROCEDURE — 87040 BLOOD CULTURE FOR BACTERIA: CPT

## 2024-08-09 PROCEDURE — 6360000002 HC RX W HCPCS: Performed by: EMERGENCY MEDICINE

## 2024-08-09 PROCEDURE — 96361 HYDRATE IV INFUSION ADD-ON: CPT

## 2024-08-09 PROCEDURE — 99285 EMERGENCY DEPT VISIT HI MDM: CPT

## 2024-08-09 PROCEDURE — 96375 TX/PRO/DX INJ NEW DRUG ADDON: CPT

## 2024-08-09 PROCEDURE — 93005 ELECTROCARDIOGRAM TRACING: CPT | Performed by: EMERGENCY MEDICINE

## 2024-08-09 PROCEDURE — 85025 COMPLETE CBC W/AUTO DIFF WBC: CPT

## 2024-08-09 PROCEDURE — 84145 PROCALCITONIN (PCT): CPT

## 2024-08-09 PROCEDURE — 82962 GLUCOSE BLOOD TEST: CPT

## 2024-08-09 PROCEDURE — 6370000000 HC RX 637 (ALT 250 FOR IP): Performed by: EMERGENCY MEDICINE

## 2024-08-09 PROCEDURE — 83605 ASSAY OF LACTIC ACID: CPT

## 2024-08-09 PROCEDURE — 6360000004 HC RX CONTRAST MEDICATION: Performed by: EMERGENCY MEDICINE

## 2024-08-09 PROCEDURE — 80053 COMPREHEN METABOLIC PANEL: CPT

## 2024-08-09 PROCEDURE — 84484 ASSAY OF TROPONIN QUANT: CPT

## 2024-08-09 PROCEDURE — 94761 N-INVAS EAR/PLS OXIMETRY MLT: CPT

## 2024-08-09 PROCEDURE — 81001 URINALYSIS AUTO W/SCOPE: CPT

## 2024-08-09 PROCEDURE — 96367 TX/PROPH/DG ADDL SEQ IV INF: CPT

## 2024-08-09 RX ORDER — KETOROLAC TROMETHAMINE 30 MG/ML
30 INJECTION, SOLUTION INTRAMUSCULAR; INTRAVENOUS
Status: COMPLETED | OUTPATIENT
Start: 2024-08-09 | End: 2024-08-09

## 2024-08-09 RX ORDER — LISINOPRIL 5 MG/1
TABLET ORAL
COMMUNITY

## 2024-08-09 RX ORDER — LIDOCAINE HYDROCHLORIDE 20 MG/ML
10 SOLUTION OROPHARYNGEAL
Status: COMPLETED | OUTPATIENT
Start: 2024-08-09 | End: 2024-08-09

## 2024-08-09 RX ORDER — 0.9 % SODIUM CHLORIDE 0.9 %
30 INTRAVENOUS SOLUTION INTRAVENOUS ONCE
Status: COMPLETED | OUTPATIENT
Start: 2024-08-09 | End: 2024-08-10

## 2024-08-09 RX ADMIN — CEFTRIAXONE SODIUM 2000 MG: 2 INJECTION, POWDER, FOR SOLUTION INTRAMUSCULAR; INTRAVENOUS at 21:36

## 2024-08-09 RX ADMIN — SODIUM CHLORIDE 1000 ML: 9 INJECTION, SOLUTION INTRAVENOUS at 21:32

## 2024-08-09 RX ADMIN — LIDOCAINE HYDROCHLORIDE 10 ML: 20 SOLUTION ORAL at 21:36

## 2024-08-09 RX ADMIN — IOPAMIDOL 91 ML: 755 INJECTION, SOLUTION INTRAVENOUS at 22:27

## 2024-08-09 RX ADMIN — SODIUM CHLORIDE 999 ML: 9 INJECTION, SOLUTION INTRAVENOUS at 22:57

## 2024-08-09 RX ADMIN — KETOROLAC TROMETHAMINE 30 MG: 30 INJECTION, SOLUTION INTRAMUSCULAR at 21:31

## 2024-08-09 RX ADMIN — SODIUM CHLORIDE 559 ML: 9 INJECTION, SOLUTION INTRAVENOUS at 23:57

## 2024-08-09 ASSESSMENT — PAIN DESCRIPTION - DESCRIPTORS
DESCRIPTORS: SORE;SHARP
DESCRIPTORS: SORE

## 2024-08-09 ASSESSMENT — PAIN DESCRIPTION - LOCATION
LOCATION: THROAT

## 2024-08-09 ASSESSMENT — LIFESTYLE VARIABLES
HOW OFTEN DO YOU HAVE A DRINK CONTAINING ALCOHOL: NEVER
HOW MANY STANDARD DRINKS CONTAINING ALCOHOL DO YOU HAVE ON A TYPICAL DAY: PATIENT DOES NOT DRINK

## 2024-08-09 ASSESSMENT — PAIN SCALES - GENERAL
PAINLEVEL_OUTOF10: 6
PAINLEVEL_OUTOF10: 7
PAINLEVEL_OUTOF10: 7
PAINLEVEL_OUTOF10: 10

## 2024-08-09 ASSESSMENT — PAIN - FUNCTIONAL ASSESSMENT: PAIN_FUNCTIONAL_ASSESSMENT: 0-10

## 2024-08-10 ENCOUNTER — HOSPITAL ENCOUNTER (INPATIENT)
Facility: HOSPITAL | Age: 49
LOS: 1 days | Discharge: HOME | DRG: 153 | End: 2024-08-10
Attending: STUDENT IN AN ORGANIZED HEALTH CARE EDUCATION/TRAINING PROGRAM | Admitting: HOSPITALIST
Payer: OTHER GOVERNMENT

## 2024-08-10 VITALS
OXYGEN SATURATION: 96 % | BODY MASS INDEX: 23.5 KG/M2 | TEMPERATURE: 97.8 F | SYSTOLIC BLOOD PRESSURE: 149 MMHG | RESPIRATION RATE: 18 BRPM | HEIGHT: 75 IN | HEART RATE: 92 BPM | WEIGHT: 189 LBS | DIASTOLIC BLOOD PRESSURE: 85 MMHG

## 2024-08-10 VITALS
SYSTOLIC BLOOD PRESSURE: 162 MMHG | HEIGHT: 75 IN | TEMPERATURE: 99.7 F | DIASTOLIC BLOOD PRESSURE: 97 MMHG | WEIGHT: 188 LBS | BODY MASS INDEX: 23.38 KG/M2 | RESPIRATION RATE: 26 BRPM | OXYGEN SATURATION: 95 % | HEART RATE: 97 BPM

## 2024-08-10 DIAGNOSIS — J36 PERITONSILLAR ABSCESS: Primary | ICD-10-CM

## 2024-08-10 LAB
BACTERIA SPEC CULT: NORMAL
BACTERIA SPEC CULT: NORMAL
CRP SERPL-MCNC: 6.12 MG/DL (ref 0–0.3)
EKG ATRIAL RATE: 116 BPM
EKG DIAGNOSIS: NORMAL
EKG P AXIS: 83 DEGREES
EKG P-R INTERVAL: 146 MS
EKG Q-T INTERVAL: 326 MS
EKG QRS DURATION: 92 MS
EKG QTC CALCULATION (BAZETT): 453 MS
EKG R AXIS: 72 DEGREES
EKG T AXIS: 80 DEGREES
EKG VENTRICULAR RATE: 116 BPM
ERYTHROCYTE [SEDIMENTATION RATE] IN BLOOD: 6 MM/HR (ref 0–20)
EST. AVERAGE GLUCOSE BLD GHB EST-MCNC: 258 MG/DL
GLUCOSE BLD STRIP.AUTO-MCNC: 242 MG/DL (ref 65–100)
HBA1C MFR BLD: 10.6 % (ref 4–5.6)
Lab: NORMAL
Lab: NORMAL
PERFORMED BY:: ABNORMAL
PROCALCITONIN SERPL-MCNC: <0.05 NG/ML

## 2024-08-10 PROCEDURE — 87076 CULTURE ANAEROBE IDENT EACH: CPT

## 2024-08-10 PROCEDURE — 0C9P3ZZ DRAINAGE OF TONSILS, PERCUTANEOUS APPROACH: ICD-10-PCS | Performed by: STUDENT IN AN ORGANIZED HEALTH CARE EDUCATION/TRAINING PROGRAM

## 2024-08-10 PROCEDURE — 84145 PROCALCITONIN (PCT): CPT

## 2024-08-10 PROCEDURE — 99285 EMERGENCY DEPT VISIT HI MDM: CPT

## 2024-08-10 PROCEDURE — 96365 THER/PROPH/DIAG IV INF INIT: CPT

## 2024-08-10 PROCEDURE — 6370000000 HC RX 637 (ALT 250 FOR IP): Performed by: PHYSICIAN ASSISTANT

## 2024-08-10 PROCEDURE — 1100000000 HC RM PRIVATE

## 2024-08-10 PROCEDURE — 36415 COLL VENOUS BLD VENIPUNCTURE: CPT

## 2024-08-10 PROCEDURE — 87075 CULTR BACTERIA EXCEPT BLOOD: CPT

## 2024-08-10 PROCEDURE — 96367 TX/PROPH/DG ADDL SEQ IV INF: CPT

## 2024-08-10 PROCEDURE — 2580000003 HC RX 258: Performed by: STUDENT IN AN ORGANIZED HEALTH CARE EDUCATION/TRAINING PROGRAM

## 2024-08-10 PROCEDURE — 6360000002 HC RX W HCPCS: Performed by: PHYSICIAN ASSISTANT

## 2024-08-10 PROCEDURE — 2580000003 HC RX 258: Performed by: EMERGENCY MEDICINE

## 2024-08-10 PROCEDURE — 6370000000 HC RX 637 (ALT 250 FOR IP): Performed by: EMERGENCY MEDICINE

## 2024-08-10 PROCEDURE — 86140 C-REACTIVE PROTEIN: CPT

## 2024-08-10 PROCEDURE — 87205 SMEAR GRAM STAIN: CPT

## 2024-08-10 PROCEDURE — 6360000002 HC RX W HCPCS: Performed by: STUDENT IN AN ORGANIZED HEALTH CARE EDUCATION/TRAINING PROGRAM

## 2024-08-10 PROCEDURE — 6360000002 HC RX W HCPCS: Performed by: EMERGENCY MEDICINE

## 2024-08-10 PROCEDURE — 92610 EVALUATE SWALLOWING FUNCTION: CPT

## 2024-08-10 PROCEDURE — 2700000000 HC OXYGEN THERAPY PER DAY

## 2024-08-10 PROCEDURE — 85652 RBC SED RATE AUTOMATED: CPT

## 2024-08-10 PROCEDURE — 94761 N-INVAS EAR/PLS OXIMETRY MLT: CPT

## 2024-08-10 PROCEDURE — 82962 GLUCOSE BLOOD TEST: CPT

## 2024-08-10 PROCEDURE — 96375 TX/PRO/DX INJ NEW DRUG ADDON: CPT

## 2024-08-10 PROCEDURE — 83036 HEMOGLOBIN GLYCOSYLATED A1C: CPT

## 2024-08-10 PROCEDURE — 87070 CULTURE OTHR SPECIMN AEROBIC: CPT

## 2024-08-10 PROCEDURE — 87147 CULTURE TYPE IMMUNOLOGIC: CPT

## 2024-08-10 PROCEDURE — 2580000003 HC RX 258: Performed by: PHYSICIAN ASSISTANT

## 2024-08-10 PROCEDURE — 2500000003 HC RX 250 WO HCPCS: Performed by: STUDENT IN AN ORGANIZED HEALTH CARE EDUCATION/TRAINING PROGRAM

## 2024-08-10 RX ORDER — LORAZEPAM 2 MG/ML
2 INJECTION INTRAMUSCULAR
Status: DISCONTINUED | OUTPATIENT
Start: 2024-08-10 | End: 2024-08-10 | Stop reason: HOSPADM

## 2024-08-10 RX ORDER — ACETAMINOPHEN 650 MG/1
650 SUPPOSITORY RECTAL EVERY 6 HOURS PRN
Status: DISCONTINUED | OUTPATIENT
Start: 2024-08-10 | End: 2024-08-10 | Stop reason: HOSPADM

## 2024-08-10 RX ORDER — SODIUM CHLORIDE 9 MG/ML
INJECTION, SOLUTION INTRAVENOUS PRN
Status: DISCONTINUED | OUTPATIENT
Start: 2024-08-10 | End: 2024-08-10 | Stop reason: HOSPADM

## 2024-08-10 RX ORDER — LIDOCAINE HYDROCHLORIDE 20 MG/ML
5 SOLUTION OROPHARYNGEAL
Status: COMPLETED | OUTPATIENT
Start: 2024-08-10 | End: 2024-08-10

## 2024-08-10 RX ORDER — GAUZE BANDAGE 2" X 2"
100 BANDAGE TOPICAL DAILY
Status: DISCONTINUED | OUTPATIENT
Start: 2024-08-10 | End: 2024-08-10 | Stop reason: HOSPADM

## 2024-08-10 RX ORDER — INSULIN LISPRO 100 [IU]/ML
0-8 INJECTION, SOLUTION INTRAVENOUS; SUBCUTANEOUS
Status: DISCONTINUED | OUTPATIENT
Start: 2024-08-10 | End: 2024-08-10 | Stop reason: HOSPADM

## 2024-08-10 RX ORDER — DEXAMETHASONE SODIUM PHOSPHATE 4 MG/ML
4 INJECTION, SOLUTION INTRA-ARTICULAR; INTRALESIONAL; INTRAMUSCULAR; INTRAVENOUS; SOFT TISSUE EVERY 6 HOURS
Status: DISCONTINUED | OUTPATIENT
Start: 2024-08-10 | End: 2024-08-10 | Stop reason: HOSPADM

## 2024-08-10 RX ORDER — LORAZEPAM 1 MG/1
2 TABLET ORAL
Status: DISCONTINUED | OUTPATIENT
Start: 2024-08-10 | End: 2024-08-10 | Stop reason: HOSPADM

## 2024-08-10 RX ORDER — ONDANSETRON 2 MG/ML
4 INJECTION INTRAMUSCULAR; INTRAVENOUS EVERY 6 HOURS PRN
Status: DISCONTINUED | OUTPATIENT
Start: 2024-08-10 | End: 2024-08-10 | Stop reason: HOSPADM

## 2024-08-10 RX ORDER — ONDANSETRON 4 MG/1
4 TABLET, ORALLY DISINTEGRATING ORAL EVERY 8 HOURS PRN
Status: DISCONTINUED | OUTPATIENT
Start: 2024-08-10 | End: 2024-08-10 | Stop reason: HOSPADM

## 2024-08-10 RX ORDER — LIDOCAINE HYDROCHLORIDE AND EPINEPHRINE 10; 10 MG/ML; UG/ML
20 INJECTION, SOLUTION INFILTRATION; PERINEURAL ONCE
Status: DISCONTINUED | OUTPATIENT
Start: 2024-08-10 | End: 2024-08-10 | Stop reason: HOSPADM

## 2024-08-10 RX ORDER — POTASSIUM CHLORIDE 7.45 MG/ML
10 INJECTION INTRAVENOUS PRN
Status: DISCONTINUED | OUTPATIENT
Start: 2024-08-10 | End: 2024-08-10 | Stop reason: HOSPADM

## 2024-08-10 RX ORDER — INSULIN LISPRO 100 [IU]/ML
0-4 INJECTION, SOLUTION INTRAVENOUS; SUBCUTANEOUS NIGHTLY
Status: DISCONTINUED | OUTPATIENT
Start: 2024-08-10 | End: 2024-08-10 | Stop reason: HOSPADM

## 2024-08-10 RX ORDER — THIAMINE MONONITRATE (VIT B1) 100 MG
100 TABLET ORAL DAILY
Qty: 7 TABLET | Refills: 0 | Status: SHIPPED | OUTPATIENT
Start: 2024-08-11 | End: 2024-08-18

## 2024-08-10 RX ORDER — INSULIN LISPRO 100 [IU]/ML
0-8 INJECTION, SOLUTION INTRAVENOUS; SUBCUTANEOUS
Qty: 5 ADJUSTABLE DOSE PRE-FILLED PEN SYRINGE | Refills: 0 | Status: SHIPPED | OUTPATIENT
Start: 2024-08-10 | End: 2024-09-09

## 2024-08-10 RX ORDER — CLINDAMYCIN PHOSPHATE 900 MG/50ML
900 INJECTION, SOLUTION INTRAVENOUS
Status: COMPLETED | OUTPATIENT
Start: 2024-08-10 | End: 2024-08-10

## 2024-08-10 RX ORDER — AMOXICILLIN AND CLAVULANATE POTASSIUM 875; 125 MG/1; MG/1
1 TABLET, FILM COATED ORAL 2 TIMES DAILY
Qty: 20 TABLET | Refills: 0 | Status: SHIPPED | OUTPATIENT
Start: 2024-08-10 | End: 2024-08-20

## 2024-08-10 RX ORDER — FOLIC ACID 1 MG/1
1 TABLET ORAL DAILY
Qty: 7 TABLET | Refills: 0 | Status: SHIPPED | OUTPATIENT
Start: 2024-08-11 | End: 2024-08-18

## 2024-08-10 RX ORDER — DEXAMETHASONE SODIUM PHOSPHATE 10 MG/ML
8 INJECTION, SOLUTION INTRAMUSCULAR; INTRAVENOUS ONCE
Status: COMPLETED | OUTPATIENT
Start: 2024-08-10 | End: 2024-08-10

## 2024-08-10 RX ORDER — LORAZEPAM 1 MG/1
3 TABLET ORAL
Status: DISCONTINUED | OUTPATIENT
Start: 2024-08-10 | End: 2024-08-10 | Stop reason: HOSPADM

## 2024-08-10 RX ORDER — LISINOPRIL 10 MG/1
5 TABLET ORAL DAILY
Status: CANCELLED | OUTPATIENT
Start: 2024-08-10

## 2024-08-10 RX ORDER — INSULIN GLARGINE 100 [IU]/ML
10 INJECTION, SOLUTION SUBCUTANEOUS NIGHTLY
Status: DISCONTINUED | OUTPATIENT
Start: 2024-08-10 | End: 2024-08-10 | Stop reason: HOSPADM

## 2024-08-10 RX ORDER — LORAZEPAM 2 MG/ML
1 INJECTION INTRAMUSCULAR
Status: DISCONTINUED | OUTPATIENT
Start: 2024-08-10 | End: 2024-08-10 | Stop reason: HOSPADM

## 2024-08-10 RX ORDER — LORAZEPAM 2 MG/ML
3 INJECTION INTRAMUSCULAR
Status: DISCONTINUED | OUTPATIENT
Start: 2024-08-10 | End: 2024-08-10 | Stop reason: HOSPADM

## 2024-08-10 RX ORDER — LORAZEPAM 1 MG/1
1 TABLET ORAL
Status: DISCONTINUED | OUTPATIENT
Start: 2024-08-10 | End: 2024-08-10 | Stop reason: HOSPADM

## 2024-08-10 RX ORDER — SODIUM CHLORIDE 0.9 % (FLUSH) 0.9 %
5-40 SYRINGE (ML) INJECTION PRN
Status: DISCONTINUED | OUTPATIENT
Start: 2024-08-10 | End: 2024-08-10 | Stop reason: HOSPADM

## 2024-08-10 RX ORDER — FOLIC ACID 1 MG/1
1 TABLET ORAL DAILY
Status: DISCONTINUED | OUTPATIENT
Start: 2024-08-10 | End: 2024-08-10 | Stop reason: HOSPADM

## 2024-08-10 RX ORDER — HYDROMORPHONE HYDROCHLORIDE 1 MG/ML
0.5 INJECTION, SOLUTION INTRAMUSCULAR; INTRAVENOUS; SUBCUTANEOUS EVERY 4 HOURS PRN
Status: DISCONTINUED | OUTPATIENT
Start: 2024-08-10 | End: 2024-08-10 | Stop reason: HOSPADM

## 2024-08-10 RX ORDER — POLYETHYLENE GLYCOL 3350 17 G/17G
17 POWDER, FOR SOLUTION ORAL DAILY PRN
Status: DISCONTINUED | OUTPATIENT
Start: 2024-08-10 | End: 2024-08-10 | Stop reason: HOSPADM

## 2024-08-10 RX ORDER — SODIUM CHLORIDE 9 MG/ML
INJECTION, SOLUTION INTRAVENOUS CONTINUOUS
Status: DISCONTINUED | OUTPATIENT
Start: 2024-08-10 | End: 2024-08-10 | Stop reason: HOSPADM

## 2024-08-10 RX ORDER — POTASSIUM CHLORIDE 20 MEQ/1
40 TABLET, EXTENDED RELEASE ORAL PRN
Status: DISCONTINUED | OUTPATIENT
Start: 2024-08-10 | End: 2024-08-10 | Stop reason: HOSPADM

## 2024-08-10 RX ORDER — ACETAMINOPHEN 325 MG/1
650 TABLET ORAL EVERY 6 HOURS PRN
Status: DISCONTINUED | OUTPATIENT
Start: 2024-08-10 | End: 2024-08-10 | Stop reason: HOSPADM

## 2024-08-10 RX ORDER — LORAZEPAM 2 MG/ML
4 INJECTION INTRAMUSCULAR
Status: DISCONTINUED | OUTPATIENT
Start: 2024-08-10 | End: 2024-08-10 | Stop reason: HOSPADM

## 2024-08-10 RX ORDER — LORAZEPAM 1 MG/1
4 TABLET ORAL
Status: DISCONTINUED | OUTPATIENT
Start: 2024-08-10 | End: 2024-08-10 | Stop reason: HOSPADM

## 2024-08-10 RX ORDER — SODIUM CHLORIDE 0.9 % (FLUSH) 0.9 %
5-40 SYRINGE (ML) INJECTION EVERY 12 HOURS SCHEDULED
Status: DISCONTINUED | OUTPATIENT
Start: 2024-08-10 | End: 2024-08-10 | Stop reason: HOSPADM

## 2024-08-10 RX ORDER — MULTIVITAMIN WITH IRON
1 TABLET ORAL DAILY
Qty: 7 TABLET | Refills: 0 | Status: SHIPPED | OUTPATIENT
Start: 2024-08-11 | End: 2024-08-18

## 2024-08-10 RX ORDER — MULTIVITAMIN WITH IRON
1 TABLET ORAL DAILY
Status: DISCONTINUED | OUTPATIENT
Start: 2024-08-10 | End: 2024-08-10 | Stop reason: HOSPADM

## 2024-08-10 RX ORDER — MAGNESIUM SULFATE IN WATER 40 MG/ML
2000 INJECTION, SOLUTION INTRAVENOUS PRN
Status: DISCONTINUED | OUTPATIENT
Start: 2024-08-10 | End: 2024-08-10 | Stop reason: HOSPADM

## 2024-08-10 RX ORDER — MECLIZINE HYDROCHLORIDE 25 MG/1
25 TABLET ORAL 3 TIMES DAILY PRN
Status: CANCELLED | OUTPATIENT
Start: 2024-08-10

## 2024-08-10 RX ORDER — HYDROMORPHONE HYDROCHLORIDE 1 MG/ML
0.5 INJECTION, SOLUTION INTRAMUSCULAR; INTRAVENOUS; SUBCUTANEOUS
Status: COMPLETED | OUTPATIENT
Start: 2024-08-10 | End: 2024-08-10

## 2024-08-10 RX ADMIN — FOLIC ACID 1 MG: 1 TABLET ORAL at 15:31

## 2024-08-10 RX ADMIN — Medication 10 ML: at 11:04

## 2024-08-10 RX ADMIN — AMPICILLIN SODIUM AND SULBACTAM SODIUM 3000 MG: 2; 1 INJECTION, POWDER, FOR SOLUTION INTRAMUSCULAR; INTRAVENOUS at 11:16

## 2024-08-10 RX ADMIN — HYDROMORPHONE HYDROCHLORIDE 0.5 MG: 1 INJECTION, SOLUTION INTRAMUSCULAR; INTRAVENOUS; SUBCUTANEOUS at 04:35

## 2024-08-10 RX ADMIN — THERA TABS 1 TABLET: TAB at 15:31

## 2024-08-10 RX ADMIN — DEXAMETHASONE SODIUM PHOSPHATE 8 MG: 10 INJECTION, SOLUTION INTRAMUSCULAR; INTRAVENOUS at 04:36

## 2024-08-10 RX ADMIN — LIDOCAINE HYDROCHLORIDE 5 ML: 20 SOLUTION ORAL at 01:44

## 2024-08-10 RX ADMIN — SODIUM CHLORIDE 3000 MG: 900 INJECTION INTRAVENOUS at 04:36

## 2024-08-10 RX ADMIN — THIAMINE HCL TAB 100 MG 100 MG: 100 TAB at 15:31

## 2024-08-10 RX ADMIN — SODIUM CHLORIDE: 9 INJECTION, SOLUTION INTRAVENOUS at 01:10

## 2024-08-10 RX ADMIN — DEXAMETHASONE SODIUM PHOSPHATE 4 MG: 4 INJECTION INTRA-ARTICULAR; INTRALESIONAL; INTRAMUSCULAR; INTRAVENOUS; SOFT TISSUE at 17:11

## 2024-08-10 RX ADMIN — CLINDAMYCIN PHOSPHATE 900 MG: 900 INJECTION, SOLUTION INTRAVENOUS at 01:10

## 2024-08-10 RX ADMIN — AMPICILLIN SODIUM AND SULBACTAM SODIUM 3000 MG: 2; 1 INJECTION, POWDER, FOR SOLUTION INTRAMUSCULAR; INTRAVENOUS at 17:11

## 2024-08-10 RX ADMIN — DEXAMETHASONE SODIUM PHOSPHATE 4 MG: 4 INJECTION INTRA-ARTICULAR; INTRALESIONAL; INTRAMUSCULAR; INTRAVENOUS; SOFT TISSUE at 11:15

## 2024-08-10 ASSESSMENT — PAIN DESCRIPTION - LOCATION
LOCATION: THROAT
LOCATION: MOUTH

## 2024-08-10 ASSESSMENT — PAIN SCALES - GENERAL
PAINLEVEL_OUTOF10: 8
PAINLEVEL_OUTOF10: 8
PAINLEVEL_OUTOF10: 6
PAINLEVEL_OUTOF10: 7
PAINLEVEL_OUTOF10: 8
PAINLEVEL_OUTOF10: 5
PAINLEVEL_OUTOF10: 10

## 2024-08-10 ASSESSMENT — ENCOUNTER SYMPTOMS
COUGH: 0
FACIAL SWELLING: 1
RHINORRHEA: 0
TROUBLE SWALLOWING: 1
ABDOMINAL PAIN: 0
SHORTNESS OF BREATH: 0
VOICE CHANGE: 0
BACK PAIN: 0
WHEEZING: 0
VOMITING: 0
SORE THROAT: 1
NAUSEA: 0

## 2024-08-10 NOTE — ED PROVIDER NOTES
[facility] EMERGENCY DEPT  EMERGENCY DEPARTMENT ENCOUNTER       Pt Name: Adolfo Murphy  MRN: [id number]  Birthdate 1975  Date of evaluation: 8/9/2024  Provider: Merlene Rangel MD   PCP: None, None  Note Started: 11:46 PM 8/9/24     CHIEF COMPLAINT       Chief Complaint   Patient presents with    Sore Throat    Throat swelling        HISTORY OF PRESENT ILLNESS: 1 or more elements      History From: Patient  History limited by: Nothing     Adolfo Murphy is a 49 y.o. male who presents to ED complaining of sore throat.  He reports sore throat began about 6 weeks ago, was seen at an urgent care 6 days ago and placed on steroids.  He also reports a negative strep test.  He reports that tonight his throat became worse, was painful, felt swollen and the difficulty breathing when he laid down.  He denies fever, chills, headache or chest pain.  Patient also reports this is first day he has been alone, wife left.  He reports being anxious and stressed about it.     Nursing Notes were all reviewed and agreed with or any disagreements were addressed in the HPI.     REVIEW OF SYSTEMS      Review of Systems     Positives and Pertinent negatives as per HPI.    PAST HISTORY     Past Medical History:  Past Medical History:   Diagnosis Date    Diabetes (HCC)     Disc disorder     Hypercholesteremia     Hypertension     Sciatica        Past Surgical History:  Past Surgical History:   Procedure Laterality Date    APPENDECTOMY      COLONOSCOPY  10/28/2022    INGUINAL HERNIA REPAIR      KNEE ARTHROSCOPY Right     ORTHOPEDIC SURGERY      SPINE SURGERY      VASECTOMY         Family History:  Family History   Problem Relation Age of Onset    Diabetes Mother     Hypertension Mother     Cancer Mother     Heart Disease Mother     Diabetes Maternal Grandmother        Social History:  Social History     Tobacco Use    Smoking status: Every Day     Current packs/day: 0.25     Types: Cigarettes    Smokeless tobacco: Current    Substance Use Topics    Alcohol use: Yes    Drug use: Not Currently       Allergies:  No Known Allergies    CURRENT MEDICATIONS      Previous Medications    GABAPENTIN (NEURONTIN) 400 MG CAPSULE    Take 2 capsules by mouth 3 times daily.    LISINOPRIL (PRINIVIL;ZESTRIL) 5 MG TABLET        MECLIZINE (ANTIVERT) 25 MG TABLET    Take 1 tablet by mouth 3 times daily as needed       SCREENINGS               No data recorded         PHYSICAL EXAM      Vitals:    08/09/24 2124 08/09/24 2200 08/09/24 2250 08/09/24 2256   BP: (!) 182/119 (!) 182/108 (!) 171/102 (!) 166/102   Pulse: (!) 115 (!) 107 (!) 101 (!) 102   Resp: 24 26 24 21   Temp:       TempSrc:       SpO2: (!) 89% 93% 95% 94%   Weight:       Height:         Physical Exam    Nursing notes and vital signs reviewed  Constitutional: Non toxic appearing, moderate distress  Head: Normocephalic, Atraumatic  Eyes: EOMI  Neck: Supple  Cardiovascular: Tarchycardic, Regular rate and rhythm, no murmurs, rubs, or gallops  Chest: Normal work of breathing and chest excursion bilaterally  Lungs: Clear to ausculation bilaterally  Abdomen: Soft, non tender, non distended, normoactive bowel sounds  Back: No evidence of trauma or deformity  Extremities: No evidence of trauma or deformity, no LE edema  Skin: Warm and dry, normal cap refill  Neuro: Alert and appropriate, CN intact, normal speech, strength and sensation full and symmetric bilaterally, normal gait, normal coordination  Psychiatric: Normal mood and affect     DIAGNOSTIC RESULTS   LABS:     Labs Reviewed   CBC WITH AUTO DIFFERENTIAL - Abnormal; Notable for the following components:       Result Value    Hemoglobin 16.6 (*)     MCH 34.5 (*)     Neutrophils % 74 (*)     Lymphocytes % 16 (*)     Immature Granulocytes % 1 (*)     Neutrophils Absolute 9.5 (*)     Immature Granulocytes Absolute 0.1 (*)     All other components within normal limits   COMPREHENSIVE METABOLIC PANEL - Abnormal; Notable for the following components:

## 2024-08-10 NOTE — ED PROVIDER NOTES
[facility] EMERGENCY DEPT  EMERGENCY DEPARTMENT HISTORY AND PHYSICAL EXAM      Date: 8/10/2024  Patient Name: Adolfo Murphy  MRN: [id number]  Birthdate 1975  Date of evaluation: 8/10/2024  Provider: Harjinder Johnston MD   Note Started: 4:09 AM EDT 8/10/24    HISTORY OF PRESENT ILLNESS   No chief complaint on file.      History Provided By: Patient    HPI: Adolfo Murphy is a 49 y.o. male with PMH of DM, HTN and HLD who comes to the ED for evaluation of sore throat.  Patient was seen at outside facility in which she was diagnosed with peritonsillar abscess.  Consultation was done with ENT at this facility in which the patient was recommended to transfer here.  In the ED, patient started to feel better.  Still have some discomfort in his throat.  He is maintaining his secretions denying any drooling.  He has been having URI-like symptoms for a week in which she had negative strep.  Tonight his pain has worsened which brought him to go to [place] facility in which she had imaging showing that he has a peritonsillar abscess in the left.    PAST MEDICAL HISTORY   Past Medical History:  Past Medical History:   Diagnosis Date    Diabetes (HCC)     Disc disorder     Hypercholesteremia     Hypertension     Sciatica        Past Surgical History:  Past Surgical History:   Procedure Laterality Date    APPENDECTOMY      COLONOSCOPY  10/28/2022    INGUINAL HERNIA REPAIR      KNEE ARTHROSCOPY Right     ORTHOPEDIC SURGERY      SPINE SURGERY      VASECTOMY         Family History:  Family History   Problem Relation Age of Onset    Diabetes Mother     Hypertension Mother     Cancer Mother     Heart Disease Mother     Diabetes Maternal Grandmother        Social History:  Social History     Tobacco Use    Smoking status: Every Day     Current packs/day: 0.25     Types: Cigarettes    Smokeless tobacco: Current   Substance Use Topics    Alcohol use: Yes    Drug use: Not Currently       Allergies:  No Known

## 2024-08-10 NOTE — H&P
Hospitalist Admission Note    NAME: Adolfo Murphy   :  1975   MRN:  [id number]     Date/Time:  8/10/2024 10:22 AM    Patient PCP: None, None    ______________________________________________________________________  Given the patient's current clinical presentation, I have a high level of concern for decompensation if discharged from the emergency department.  Complex decision making was performed, which includes reviewing the patient's available past medical records, laboratory results, and x-ray films.       My assessment of this patient's clinical condition and my plan of care is as follows.    Assessment / Plan:  Peritonsillar abscess  - IV Unasyn  - ENT consult  - Plans for possible aspiration  - Monitor airway closely  - IV Decadron  - Pain control as needed  - Clear liquid diet for now    Diabetes type 2 with hyperglycemia  - Restart home Lantus 10 units nightly  - Sliding scale insulin with medium dose correction for now, adjust regimen following 24 hours of coverage, suspect will need coverage secondary to steroid use  - Hypoglycemia protocols with insulin use  - Check A1c    Essential hypertension  - Restart home lisinopril 5 mg    Alcohol use, mild  - Reports drinking about 2 glasses of whiskey/vodka at night  - CIWA protocol  - Ativan as needed for withdrawal  - Supplement with thiamine, folate, multivitamin    Code Status: Full  DVT Prophylaxis: Lovenox following procedure, SCDs for now  GI Prophylaxis: Protonix    ---------------------------------------------------------------------  [x] High (any 2)    A. Problems (any 1)  [x] Acute/Chronic Illness/injury posing threat to life or bodily function: Peritonsillar abscess  [] Severe exacerbation of chronic illness:    ---------------------------------------------------------------------  B. Risk of Treatment (any 1)   [x] Drugs/treatments that require intensive monitoring for toxicity include:    [x] IV ABX requiring serial renal  08/09/2024    Narrative  INDICATION: dysphagia, difficulty swallowing.    COMPARISON: CT cervical spine on 11/14/2019.    TECHNIQUE:  Axial CT neck following administration of IV contrast. Sagittal and  coronal reformats were generated.    CT dose reduction was achieved through use of a standardized protocol tailored  for this examination and automatic exposure control for dose modulation.    CONTRAST: Post IV nonionic iodinated contrast.    FINDINGS:    NASOPHARYNX: Normal.  SUPRAHYOID NECK: Peripherally enhancing abscess in the anterior left lateral  aspect of the left palatine tonsil measures 1.9 x 1.6 x 2.0 cm. Mild mass effect  on the patent airway. Epiglottis is within normal limits.  INFRAHYOID NECK: Normal larynx, hypopharynx and supraglottis.  PAROTID GLANDS: Normal.  SUBMANDIBULAR GLANDS: Normal.  THYROID GLAND: Normal. No nodule.  LYMPH NODES: Reactive left cervical lymph nodes measure up to 2.2 cm in long  axis. No necrotic lymph node..  LUNG APICES: Clear.  OSSEOUS STRUCTURES: Mild degenerative disc disease in the cervical spine..  ADDITIONAL COMMENTS: Orbits and base of the brain are within normal limits.  Carotid bifurcation calcific atherosclerosis may be increased..    Impression  Left palatine tonsillar abscess. Patent airway.    Electronically signed by Angelito Lange        Xray Result (most recent):  XR CHEST PORTABLE 08/09/2024    Narrative  EXAM:  XR CHEST PORTABLE    INDICATION: Sepsis    COMPARISON: CT chest on 11/14/2019    TECHNIQUE: Upright portable chest AP view    FINDINGS: Cardiac monitoring wires overlie the thorax. The cardiac silhouette is  within normal limits. The pulmonary vasculature is within normal limits.    The lungs and pleural spaces are clear. Thoracic spinal stimulator is new.    Impression  No acute process on portable chest.      Electronically signed by Angelito Lange        _______________________________________________________________________    TOTAL TIME:  70

## 2024-08-10 NOTE — CONSULTS
Subjective:   Adolfo Murphy   49 y.o.   1975     Otolaryngology Consult Note:  Reason for consult: PTA    History of Present Illness:  Adolfo Murphy is a 49 y.o. male with past medical history of DM, HLD, transferred for tonsillitis with peritonsillar abscess.     Patient was seen at urgent care on 8/6/24, he was ordered steroids per note.  Patient reports he was never given ABx. Was then seen at outside ED yesterday, found to have a PTA. Patient was then started on IV Unasyn and transferred to [place] for ENT evaluation.     Review of Systems  Consitutional: denies fever, excessive weight gain or loss.  Eyes: denies diplopia, eye pain.  Integumentary: denies new concerning skin lesions.  Ears, Nose, Mouth, Throat: denies except as per HPI.  Endocrine: denies hot or cold intolerance, increased thirst.  Respiratory: denies cough, hemoptysis, wheezing  Gastrointestinal: denies trouble swallowing, nausea, emesis, regurgitation  Musculoskeletal: denies muscle weakness or wasting  Cardiovascular: denies chest pain, shortness of breath  Neurologic: denies seizures, numbness or tingling, syncope  Hematologic: denies easy bleeding or bruising       Past Medical History:   Diagnosis Date    Diabetes (HCC)     Disc disorder     Hypercholesteremia     Hypertension     Sciatica      Past Surgical History:   Procedure Laterality Date    APPENDECTOMY      COLONOSCOPY  10/28/2022    INGUINAL HERNIA REPAIR      KNEE ARTHROSCOPY Right     ORTHOPEDIC SURGERY      SPINE SURGERY      VASECTOMY        Family History   Problem Relation Age of Onset    Diabetes Mother     Hypertension Mother     Cancer Mother     Heart Disease Mother     Diabetes Maternal Grandmother      Social History     Tobacco Use    Smoking status: Every Day     Current packs/day: 0.25     Types: Cigarettes    Smokeless tobacco: Current   Substance Use Topics    Alcohol use: Yes      Prior to Admission medications    Medication Sig Start  Purulence was aspirated from the abscess.  Three separate passes were made to ensure adequate drainage of the abscess.  Using a Yankauer suction, further purulence was expressed. Once all purulence was expressed, hemostasis was achieved with cold water rinse.    Assessment/Plan:   Assessment:   Adolfo Murphy is a 49 y.o. male with left PTA. Drained at bedside    Plan:   Continue IV ABx   Control blood sugars - will make infection worse and recovery time longer  Can transition to PO Augmentin for 10 days once tolerating PO and pain improved   Follow up with ENT in 2 weeks from discharge     Plan for medical issues were discussed with patient including observation, medical management, and possible surgical/procedural intervention if they fail conservative therapy. The risks and benefits of the considered procedures were discussed with the patient. All patient questions were answered.     John Logan MD   [facility]  [address]  [place] [address]  Office Phone: [phone]

## 2024-08-10 NOTE — CARE COORDINATION
Transition of Care Plan:    RUR: 8%  Prior Level of Functioning: INDEP  Disposition: HOME/SELF  If SNF or IPR: Date FOC offered:   Date FOC received:   Accepting facility: HOME  Date authorization started with reference number:   Date authorization received and expires:   Follow up appointments: PCP/ENT  DME needed: NO  Transportation at discharge: FAMILY  IM/IMM Medicare/ letter given: NA  Is patient a  and connected with [facility]? NO   If yes, was  transfer form completed and VA notified?   Caregiver Contact: NA  Discharge Caregiver contacted prior to discharge? NA  Care Conference needed? NO  Barriers to discharge: NONE    CM acknowledged, SW consult for rehab consideration, hospitalist verbally dc'd consult. Pt to dc home w/ self care, no needs from CM.

## 2024-08-10 NOTE — ED NOTES
TRANSFER - OUT REPORT:    Verbal report given to Kaylen Bolden RN on Adolfo Murphy  being transferred to [facility] ED for urgent transfer       Report consisted of patient's Situation, Background, Assessment and   Recommendations(SBAR).     Information from the following report(s) Nurse Handoff Report, ED Encounter Summary, MAR, Med Rec Status, and Cardiac Rhythm SR-ST  was reviewed with the receiving nurse.    Rayle Fall Assessment:    Presents to emergency department  because of falls (Syncope, seizure, or loss of consciousness): No  Age > 70: No  Altered Mental Status, Intoxication with alcohol or substance confusion (Disorientation, impaired judgment, poor safety awaremess, or inability to follow instructions): No  Impaired Mobility: Ambulates or transfers with assistive devices or assistance; Unable to ambulate or transer.: No  Nursing Judgement: No          Lines:   Peripheral IV 08/09/24 Right Antecubital (Active)        Opportunity for questions and clarification was provided.      Patient transported with:  Monitor and O2 @ 2lpm

## 2024-08-10 NOTE — ED TRIAGE NOTES
Patient arrives via EMS from [facility]. Patient is being transferred for an peritonsillar abscess. Pt arrived to the sending facility for a sore throat. Pt was seen at an urgent care facility previously and was given steroids and was diagnosed with steroid induced hyperglycemia by sending facility. Pt A&Ox4 on arrival and airway appears clear and patent. Was a sepsis workup at [facility]

## 2024-08-10 NOTE — PLAN OF CARE
Speech LAnguage Pathology Dysphagia EVALUATION    Patient: Adolfo Murphy (49 y.o. male)  Date: 8/10/2024  Primary Diagnosis: Peritonsillar abscess [J36]       Precautions: Aspriation                  DIET RECOMMENDATIONS: Full thin liquid diet, once cleared by ENT and by Attending for advancement    SWALLOW SAFETY PRECAUTIONS: Upright positioning during po intake and after po intake for at least 30-60 minutes, slow rate, small-single sips and bites   ASSESSMENT :  Patient seen upright in bed, alert and cooperative.   Patient c/o odynophagia  Upon inspection missing dentition and caries, l-side neck swelling,   PO trials: puree and thin liquids via cup and straw, declines solids  Based on the objective data described below, the patient presents with mild to moderate oropharyngeal dysphagia characterized by the following  Oral phase: prolonged A-P transit, mild bolus holding, facial grimace with swallow initiatioin   Pharyngeal phase: swallow initiation delayed and effortful with initiation and HLE appears largely intact upon palpation.   Overt s/sx of aspiration/penetration: none    Concerns: L-side neck swelling in laryngeal region and prominent structures on L-side in laryngeal region.     Rec: Full thin liquid diet, once cleared by ENT and by attending     ST arreaga attending, no return call as of yet.     Problem: SLP Adult - Impaired Swallowing  Goal: By Discharge: Advance to least restrictive diet without signs or symptoms of aspiration for planned discharge setting.  See evaluation for individualized goals.  Description: Speech Therapy Swallow Goals    Initiated 8/10/2024    -Patient stated goal: swelling to go away    -Patient will tolerate Full liquid and thin liquids diet without clinical indicators of aspiration given min cues within 7 day(s).        -Patient will tolerate PO trials without clinical indicators of aspiration given min cues within 7 day(s).      -Patient will participate in modified  attention/concentration, and Appropriate safety awareness    Dysphagia:  Oral Assessment:  Oral Motor   Labial: No impairment  Dentition: Limited;Natural  Oral Hygiene: Moist  Oral Hygiene Comments: Caries  Lingual: No impairment  Velum: Unable to visualize  Mandible: No impairment    Voice:  WNL    After Treatment:  Patient left in no apparent distress in bed and Nursing notified     Pain:  VAS (numerical) 5/10, RN aware    COMMUNICATION/EDUCATION:   Swallow safety precautions, Aspiration precautions, GERD precautions, Diet recommendations, Energy conservation , and Compensatory strategies provided to Patient and Nurse via explanation, all questions/concerns addressed, requires reinforcement.    The patient's plan of care including recommendations, planned interventions, and recommended diet changes were discussed with: Registered nurse.    Patient/family have participated as able in goal setting and plan of care    Thank you,  Savannah Weldon M.S. CCC-SLP  Minutes: 10

## 2024-08-10 NOTE — DISCHARGE SUMMARY
as: AUGMENTIN  Take 1 tablet by mouth 2 times daily for 10 days     folic acid 1 MG tablet  Commonly known as: FOLVITE  Take 1 tablet by mouth daily for 7 days  Start taking on: August 11, 2024     insulin lispro (1 Unit Dial) 100 UNIT/ML Sopn  Commonly known as: HumaLOG KwikPen  Inject 0-8 Units into the skin 3 times daily (before meals) **Medium Dose Corrective Algorithm** Glucose: Dose:  No Insulin 200-249 2 Units 250-299 4 Units 300-349 6 Units Over 349 8 Units and notify PCP     multivitamin Tabs tablet  Take 1 tablet by mouth daily for 7 days  Start taking on: August 11, 2024     vitamin B-1 100 MG tablet  Commonly known as: THIAMINE  Take 1 tablet by mouth daily for 7 days  Start taking on: August 11, 2024            CONTINUE taking these medications      lisinopril 5 MG tablet  Commonly known as: PRINIVIL;ZESTRIL     meclizine 25 MG tablet  Commonly known as: ANTIVERT               Where to Get Your Medications        [facility] [address] [place] [address] [phone] -  [phone]  [address] [place] [address]      Phone: [phone]   amoxicillin-clavulanate 875-125 MG per tablet  folic acid 1 MG tablet  insulin lispro (1 Unit Dial) 100 UNIT/ML Sopn  multivitamin Tabs tablet  vitamin B-1 100 MG tablet             DISPOSITION:    Home with Family:    x   Home with HH/PT/OT/RN:    SNF/LTC:    TISH:    OTHER:            Code status: FULL  Recommended diet:  clear liquid diet for 1 week then slowly advance as tolerated  Recommended activity: activity as tolerated  Wound care: None      Follow up with:   PCP : None, None  John Logan MD  [address]  [address]  [facility] [address] [phone]    Follow up in 1 week(s)  For continued outpatient ENT care, or alternatively call an ENT in your area to discuss elective tonsillectomy    [facility] EMERGENCY DEP  [address] [address] [phone]  Follow

## 2024-08-10 NOTE — ED NOTES
ED TO INPATIENT SBAR HANDOFF    Patient Name: Adolfo Murphy   Preferred Name: Adolfo  : 1975  49 y.o.   Family/Caregiver Present: no   Code Status Order: No Order  PO Status: NPO:Yes  Telemetry Order:   C-SSRS: Risk of Suicide: No Risk  Sitter no     Restraints:     Sepsis Risk Score      Situation  No chief complaint on file.    Brief Description of Patient's Condition: Left peritonsilar abscess  Mental Status: oriented  Arrived from: [facility]  Imaging:   No orders to display     Abnormal labs:   Abnormal Labs Reviewed   POCT GLUCOSE - Abnormal; Notable for the following components:       Result Value    POC Glucose 242 (*)     All other components within normal limits       Background  Allergies: No Known Allergies  History:   Past Medical History:   Diagnosis Date    Diabetes (HCC)     Disc disorder     Hypercholesteremia     Hypertension     Sciatica        Assessment  Vitals:        Vitals:    08/10/24 0352 08/10/24 0445 08/10/24 0515 08/10/24 0645   BP: (!) 181/99 (!) 175/110 (!) 165/98 (!) 163/96   Pulse: 99 96 99 87   Resp: 18   18   Temp: 98.6 °F (37 °C)   97.9 °F (36.6 °C)   TempSrc: Oral   Oral   SpO2: 96% 93% 94% 94%     Deterioration Index (DI): Deterioration Index: 29.47  Deterioration Index (DI) Interventions Performed:    O2 Flow Rate: O2 Flow Rate (L/min): 2 L/min  O2 Device: O2 Device: Nasal cannula  Cardiac Rhythm:    Critical Lab Results: [unfilled]  Cultures: Cultures: Yes at [facility]  NIH Score: NIH     Active LDA's:   Peripheral IV 24 Right Antecubital (Active)     Active Central Lines:                          Active Wounds:    Active Muro's:    Active Feeding Tubes:      Administered Medications:   Medications   dexAMETHasone (PF) (DECADRON) injection 8 mg (8 mg IntraVENous Given 8/10/24 4166)   HYDROmorphone HCl PF (DILAUDID) injection 0.5 mg (0.5 mg IntraVENous Given 8/10/24 2025)   ampicillin-sulbactam (UNASYN) 3,000 mg in sodium chloride 0.9 % 100 mL IVPB

## 2024-08-10 NOTE — PROGRESS NOTES
Patient tolerated his clear liquids well. No issues with swallowing, ,gaging or SOB. Received all scheduled IV ABT and decadron. Orders in for discharge. Waiting arrival of his son for .

## 2024-08-10 NOTE — ED TRIAGE NOTES
Pt states he went to urgent care 6 days ago for sore throat and was placed on steroids. Pt states tonight his throat is painful, swollen, and difficult to breath. Pt has obvious swelling to L side of neck and is flushed around his neck. Airway appears clear and patent. No acute distress noted.

## 2024-08-10 NOTE — ED NOTES
Dr Rangel has spoken with ER physician at [facility] will accept patient in ER  Has also spoken with ENT at [facility], will see patient at facility

## 2024-08-11 LAB
BACTERIA SPEC CULT: ABNORMAL
GRAM STN SPEC: ABNORMAL
Lab: ABNORMAL

## 2024-08-15 LAB
BACTERIA SPEC CULT: NORMAL
BACTERIA SPEC CULT: NORMAL
Lab: NORMAL
Lab: NORMAL